# Patient Record
Sex: MALE | Race: WHITE | URBAN - METROPOLITAN AREA
[De-identification: names, ages, dates, MRNs, and addresses within clinical notes are randomized per-mention and may not be internally consistent; named-entity substitution may affect disease eponyms.]

---

## 2017-01-05 ENCOUNTER — OFFICE VISIT (OUTPATIENT)
Dept: FAMILY MEDICINE | Facility: CLINIC | Age: 37
End: 2017-01-05
Payer: COMMERCIAL

## 2017-01-05 VITALS
WEIGHT: 223.5 LBS | BODY MASS INDEX: 31.29 KG/M2 | SYSTOLIC BLOOD PRESSURE: 118 MMHG | HEART RATE: 95 BPM | TEMPERATURE: 97.8 F | RESPIRATION RATE: 14 BRPM | DIASTOLIC BLOOD PRESSURE: 75 MMHG | HEIGHT: 71 IN

## 2017-01-05 DIAGNOSIS — F33.1 MAJOR DEPRESSIVE DISORDER, RECURRENT EPISODE, MODERATE (H): Primary | ICD-10-CM

## 2017-01-05 DIAGNOSIS — I10 ESSENTIAL HYPERTENSION, BENIGN: ICD-10-CM

## 2017-01-05 PROCEDURE — 99213 OFFICE O/P EST LOW 20 MIN: CPT | Performed by: FAMILY MEDICINE

## 2017-01-05 RX ORDER — DULOXETIN HYDROCHLORIDE 60 MG/1
60 CAPSULE, DELAYED RELEASE ORAL DAILY
Qty: 30 CAPSULE | Refills: 3 | Status: SHIPPED | OUTPATIENT
Start: 2017-01-05 | End: 2017-03-10

## 2017-01-05 RX ORDER — LISINOPRIL 20 MG/1
20 TABLET ORAL DAILY
Qty: 30 TABLET | Refills: 3 | Status: SHIPPED | OUTPATIENT
Start: 2017-01-05 | End: 2017-03-10

## 2017-01-05 NOTE — MR AVS SNAPSHOT
"              After Visit Summary   1/5/2017    Jason Ortez    MRN: [id number]           Patient Information     Date Of Birth          1980        Visit Information        Provider Department      1/5/2017 8:00 AM Chris Archer MD [facility]        Today's Diagnoses     Major depressive disorder, recurrent episode, moderate (H)    -  1     Essential hypertension, benign            Follow-ups after your visit        Who to contact     If you have questions or need follow up information about today's clinic visit or your schedule please contact [facility] directly at [phone].  Normal or non-critical lab and imaging results will be communicated to you by MyChart, letter or phone within 4 business days after the clinic has received the results. If you do not hear from us within 7 days, please contact the clinic through [employer] or phone. If you have a critical or abnormal lab result, we will notify you by phone as soon as possible.  Submit refill requests through student[employer] or call your pharmacy and they will forward the refill request to us. Please allow 3 business days for your refill to be completed.          Additional Information About Your Visit        MyChart Information     [employer] gives you secure access to your electronic health record. If you see a primary care provider, you can also send messages to your care team and make appointments. If you have questions, please call your primary care clinic.  If you do not have a primary care provider, please call [phone] and they will assist you.        Care EveryWhere ID     This is your Care EveryWhere ID. This could be used by other organizations to access your [facility] medical records  [id number]        Your Vitals Were     Pulse Temperature Respirations Height BMI (Body Mass Index)       95 97.8  F (36.6  C) (Oral) 14 5' 11\" (1.803 m) 31.19 kg/m2        Blood Pressure from Last 3 Encounters:   01/05/17 " 118/75   12/08/16 172/98   10/19/15 142/88    Weight from Last 3 Encounters:   01/05/17 223 lb 8 oz (101.379 kg)   12/08/16 222 lb 12.8 oz (101.061 kg)   10/19/15 215 lb 9.6 oz (97.796 kg)              Today, you had the following     No orders found for display       Primary Care Provider Office Phone # Fax #    Chris Archer [phone]       [facility] [address] [place] [address]        Thank you!     Thank you for choosing [facility]  for your care. Our goal is always to provide you with excellent care. Hearing back from our patients is one way we can continue to improve our services. Please take a few minutes to complete the written survey that you may receive in the mail after your visit with us. Thank you!             Your Updated Medication List - Protect others around you: Learn how to safely use, store and throw away your medicines at www.disposemymeds.org.          This list is accurate as of: 1/5/17 10:38 AM.  Always use your most recent med list.                   Brand Name Dispense Instructions for use    DULoxetine 60 MG EC capsule    CYMBALTA    30 capsule    Take 1 capsule (60 mg) by mouth daily       lisinopril 20 MG tablet    PRINIVIL/ZESTRIL    30 tablet    Take 1 tablet (20 mg) by mouth daily

## 2017-01-05 NOTE — NURSING NOTE
"Chief Complaint   Patient presents with     Recheck Medication     Cymblata        Initial There were no vitals taken for this visit. Estimated body mass index is 31.09 kg/(m^2) as calculated from the following:    Height as of 12/8/16: 5' 11\" (1.803 m).    Weight as of 12/8/16: 222 lb 12.8 oz (101.061 kg).  BP completed using cuff size: large left arm Inna Marie MA    Gave Patient a PHQ 9 and LANDRY, did not fill it out Inna Marie MA    "

## 2017-01-05 NOTE — PROGRESS NOTES
"  SUBJECTIVE:                                                    Jason Ortez is a 36 year old male who presents to clinic today for the following health issues:      Medication Followup of Cymbalta and Lisinopril    Taking Medication as prescribed: yes    Side Effects:  None    Medication Helping Symptoms:  yes     Feels \"relaxed\", reports less anxiety. Appetite reduced. Seeing counselor once per week at [facility] in [place], [place].    Body mass index is 31.19 kg/(m^2).    Tolerating Lisinopril well. Denies dry cough, swelling.      ROS:  No dry mouth or insomnia.      This document serves as a record of the services and decisions personally performed and made by Chris Archer MD. It was created on his behalf by Iram Rust, a trained medical scribe. The creation of this document is based the provider's statements to the medical scribe.  Iram Rust 8:20 AM January 5, 2017    OBJECTIVE:                                                    /75 mmHg  Pulse 95  Temp(Src) 97.8  F (36.6  C) (Oral)  Resp 14  Ht 5' 11\" (1.803 m)  Wt 223 lb 8 oz (101.379 kg)  BMI 31.19 kg/m2  Body mass index is 31.19 kg/(m^2).     GENERAL: healthy, alert and no distress  PSYCH: mentation appears normal, eye contact is significantly improved, affect remains flat, still using two-word phrases       ASSESSMENT/PLAN:                                                      (F33.1) Major depressive disorder, recurrent episode, moderate (H)  (primary encounter diagnosis)  Comment: His impression is that this medication is far more effective than medications tried in the past, will continue.  Plan: DULoxetine (CYMBALTA) 60 MG EC capsule - Recheck 2 months.    (I10) Essential hypertension, benign  Comment: BP controlled with ACE inhibitor, will continue.  Plan: lisinopril (PRINIVIL/ZESTRIL) 20 MG tablet - Recheck 6 months.    RTC in 2 months    The information in this document, created by a scribe for me, accurately reflects " the services I personally performed and the decisions made by me. I have reviewed and approved this document for accuracy.  9:07 AM January 5, 2017    Chris Archer MD  [facility]

## 2017-01-09 ENCOUNTER — TELEPHONE (OUTPATIENT)
Dept: FAMILY MEDICINE | Facility: CLINIC | Age: 37
End: 2017-01-09

## 2017-01-09 PROBLEM — I10 BENIGN ESSENTIAL HYPERTENSION: Status: ACTIVE | Noted: 2017-01-09

## 2017-01-09 NOTE — Clinical Note
[facility]  [address] [place] [id number]  [phone]  January 17, 2017    Jason JOY [facility] [address]  [facility] [address]    Dear Jason,    I care about your health and have reviewed your health plan. I have reviewed your medical conditions, medication list, and lab results and am making recommendations based on this review, to better manage your health.    You are in particular need of attention regarding:  -Depression    I am recommending that you:  {recommendations: I wanted to touch base with you on your depression.  It is important that your depression is well controlled for you to be healthy.     Please update this PHQ-9, (depression questionnaire) so we can see how you are doing.  Our goal is to have your score be 4 or less.     Here is a list of Health Maintenance topics that are due now or due soon:  Health Maintenance Due   Topic Date Due     TETANUS IMMUNIZATION (SYSTEM ASSIGNED)  01/20/2015     LIPID SCREEN Q5 YR MALE (SYSTEM ASSIGNED)  12/04/2015     PHQ-9 Q6 MONTHS (NO INBASKET)  07/27/2016     INFLUENZA VACCINE (SYSTEM ASSIGNED)  09/01/2016       Please call us at [phone] (or use [employer]) to address the above recommendations.     Thank you for trusting [facility] and we appreciate the opportunity to serve you.  We look forward to supporting your healthcare needs in the future.    Healthy Regards,    Libia Escobar MD

## 2017-01-10 NOTE — TELEPHONE ENCOUNTER
Panel Management Review      Patient has the following on his problem list:     Depression / Dysthymia review  PHQ-9 SCORE 9/2/2015 11/17/2015 1/27/2016   Total Score - - -   Total Score 2 15 1      Patient is due for:  PHQ9    Hypertension   Last three blood pressure readings:  BP Readings from Last 3 Encounters:   01/05/17 118/75   12/08/16 172/98   10/19/15 142/88     Blood pressure: Passed    HTN Guidelines:  Age 18-59 BP range:  Less than 140/90  Age 60-85 with Diabetes:  Less than 140/90  Age 60-85 without Diabetes:  less than 150/90      Composite cancer screening  Chart review shows that this patient is due/due soon for the following None  Summary:    Patient is due/failing the following:   PHQ9    Action needed:   Patient needs to do PHQ9.    Type of outreach:    Phone, left message for patient to call back.     Questions for provider review:    None                                                                                                                                    Ronny Leal CMA       Chart routed to Care Team .

## 2017-01-10 NOTE — TELEPHONE ENCOUNTER
Can you please call this patient and go through PHQ-9 and document on chart?  thanks    Also may be due for tdap

## 2017-03-10 ENCOUNTER — OFFICE VISIT (OUTPATIENT)
Dept: FAMILY MEDICINE | Facility: CLINIC | Age: 37
End: 2017-03-10
Payer: COMMERCIAL

## 2017-03-10 VITALS
WEIGHT: 231.7 LBS | HEIGHT: 71 IN | RESPIRATION RATE: 18 BRPM | HEART RATE: 83 BPM | BODY MASS INDEX: 32.44 KG/M2 | DIASTOLIC BLOOD PRESSURE: 88 MMHG | SYSTOLIC BLOOD PRESSURE: 118 MMHG | OXYGEN SATURATION: 99 %

## 2017-03-10 DIAGNOSIS — F40.298 NEEDLE PHOBIA: ICD-10-CM

## 2017-03-10 DIAGNOSIS — F33.1 MAJOR DEPRESSIVE DISORDER, RECURRENT EPISODE, MODERATE (H): Primary | ICD-10-CM

## 2017-03-10 DIAGNOSIS — R36.9 URETHRAL DISCHARGE IN MALE: ICD-10-CM

## 2017-03-10 DIAGNOSIS — I10 ESSENTIAL HYPERTENSION, BENIGN: ICD-10-CM

## 2017-03-10 PROCEDURE — 99214 OFFICE O/P EST MOD 30 MIN: CPT | Performed by: FAMILY MEDICINE

## 2017-03-10 RX ORDER — DOXYCYCLINE 100 MG/1
100 CAPSULE ORAL 2 TIMES DAILY
Qty: 14 CAPSULE | Refills: 0 | Status: SHIPPED | OUTPATIENT
Start: 2017-03-10 | End: 2017-11-28

## 2017-03-10 RX ORDER — LISINOPRIL 20 MG/1
20 TABLET ORAL DAILY
Qty: 30 TABLET | Refills: 5 | Status: SHIPPED | OUTPATIENT
Start: 2017-03-10 | End: 2017-05-03

## 2017-03-10 RX ORDER — DULOXETIN HYDROCHLORIDE 60 MG/1
60 CAPSULE, DELAYED RELEASE ORAL DAILY
Qty: 30 CAPSULE | Refills: 5 | Status: SHIPPED | OUTPATIENT
Start: 2017-03-10 | End: 2017-05-03

## 2017-03-10 NOTE — PROGRESS NOTES
"  SUBJECTIVE:                                                    Jason Ortez is a 36 year old male who presents to clinic today for the following health issues:    Depression and Anxiety Follow-Up    Status since last visit: No change    Other associated symptoms:None    Complicating factors:     Significant life event: No     Current substance abuse: None    PHQ-9 SCORE 9/2/2015 11/17/2015 1/27/2016   Total Score - - -   Total Score 2 15 1     LANDRY-7 SCORE 9/2/2015 11/17/2015 1/27/2016   Total Score - - -   Total Score 6 18 2        PHQ-9  English      PHQ-9   Any Language     GAD7       Amount of exercise or physical activity: 4-5 days/week for an average of 45-60 minutes    Problems taking medications regularly: No    Medication side effects: none    Diet: regular (no restrictions)    Major depressive disorder, recurrent episode, moderate (H)  (primary encounter diagnosis)  PHQ-9 SCORE 9/2/2015 11/17/2015 1/27/2016   Total Score - - -   Total Score 2 15 1       Needle phobia he refuses lab. Last blood draw \"punched nurse\"  Urethral discharge in male 7-10 days painless refuses evaluation  Essential hypertension, benign controlled  Past Medical History   Diagnosis Date     Cervicalgia 2/12/2014     Controlled substance agreement signed 10/19/2015     Elevated blood pressure 10/21/2015     Generalized anxiety disorder      Cathy Ellen [phone]     Immunization deficiency 10/19/2015     Major depressive disorder, recurrent episode, moderate (H) 10/19/2015     Moderate major depression (H) 5/17/2011     PTSD (post-traumatic stress disorder) 7/23/2014     Screening for other and unspecified cardiovascular conditions 5/17/2011     Social phobia 7/25/2012       Past Surgical History   Procedure Laterality Date     C nonspecific procedure  1998     ORIF talus on the left and removed     Orthopedic surgery       broken talis bone 1998     Vasectomy         Family History   Problem Relation Age of Onset     Family " "History Negative       CANCER Maternal Grandmother      colon cancer     Schizophrenia Maternal Grandmother      MENTAL ILLNESS Maternal Grandmother        Social History   Substance Use Topics     Smoking status: Former Smoker     Packs/day: 3.00     Years: 21.00     Types: Cigarettes     Quit date: 7/12/2011     Smokeless tobacco: Never Used      Comment: PT reports 1-3 ppd habit 5/17/11     Alcohol use Yes      Comment: 3-6 per month         ROS: no fever rash    This document serves as a record of the services and decisions personally performed and made by Gianni Perez MD. It was created on his behalf by Anthony Linda a trained medical scribe. The creation of this document is based the provider's statements to the medical scribe. Anthony Linda March 10, 2017 4:55 PM  OBJECTIVE:                                                    /88 (BP Location: Right arm, Patient Position: Chair, Cuff Size: Adult Regular)  Pulse 83  Resp 18  Ht 1.803 m (5' 11\")  Wt 105.1 kg (231 lb 11.2 oz)  SpO2 99%  BMI 32.32 kg/m2  Body mass index is 32.32 kg/(m^2).  GEN: Healthy, Angry. More engaged than previously     ASSESSMENT/PLAN:                                                    (F40.298) Needle phobia    Comment: Patient declines any blood work Discussed meds: he woul rather forego Rx than have lab    (R36.9) Urethral discharge in male  Comment: Rx empirically. Covers chlamydia non specific urethritis  Plan: doxycycline (VIBRAMYCIN) 100 MG capsule            (F33.1) Major depressive disorder, recurrent episode, moderate (H)  Comment: Refill   PHQ-9 SCORE 9/2/2015 11/17/2015 1/27/2016   Total Score - - -   Total Score 2 15 1   Plan: DULoxetine (CYMBALTA) 60 MG EC capsule        Best response ever. Records from Pediatrics not received    (I10) Essential hypertension, benign  Comment: Refill. controlled  BP Readings from Last 6 Encounters:   03/10/17 118/88   01/05/17 118/75   12/08/16 (!) 172/98   10/19/15 142/88   02/18/15 " 118/84   11/26/14 104/70   Plan: lisinopril (PRINIVIL/ZESTRIL) 20 MG tablet            RCK6 mos. Discussed needle phobia, benefit of lab    The information in this document, created by a scribe for me, accurately reflects the services I personally performed and the decisions made by me. I have reviewed and approved this document for accuracy. 4:55 PM March 10, 2017    JOÃO FOWLER MD  [facility]

## 2017-03-10 NOTE — MR AVS SNAPSHOT
After Visit Summary   3/10/2017    Jason Ortez    MRN: [id number]           Patient Information     Date Of Birth          1980        Visit Information        Provider Department      3/10/2017 4:30 PM Chris Archer MD [facility]        Today's Diagnoses     Major depressive disorder, recurrent episode, moderate (H)    -  1    Needle phobia        Urethral discharge in male        Essential hypertension, benign          Care Instructions    Recommend a blood test        Follow-ups after your visit        Who to contact     If you have questions or need follow up information about today's clinic visit or your schedule please contact [facility] directly at [phone].  Normal or non-critical lab and imaging results will be communicated to you by MyChart, letter or phone within 4 business days after the clinic has received the results. If you do not hear from us within 7 days, please contact the clinic through MyChart or phone. If you have a critical or abnormal lab result, we will notify you by phone as soon as possible.  Submit refill requests through [employer] or call your pharmacy and they will forward the refill request to us. Please allow 3 business days for your refill to be completed.          Additional Information About Your Visit        MyChart Information     [employer] gives you secure access to your electronic health record. If you see a primary care provider, you can also send messages to your care team and make appointments. If you have questions, please call your primary care clinic.  If you do not have a primary care provider, please call [phone] and they will assist you.        Care EveryWhere ID     This is your Care EveryWhere ID. This could be used by other organizations to access your [facility] medical records  [id number]        Your Vitals Were     Pulse Respirations Height Pulse Oximetry BMI (Body Mass Index)       83 18 5'  "11\" (1.803 m) 99% 32.32 kg/m2        Blood Pressure from Last 3 Encounters:   03/10/17 118/88   01/05/17 118/75   12/08/16 (!) 172/98    Weight from Last 3 Encounters:   03/10/17 231 lb 11.2 oz (105.1 kg)   01/05/17 223 lb 8 oz (101.4 kg)   12/08/16 222 lb 12.8 oz (101.1 kg)              Today, you had the following     No orders found for display         Today's Medication Changes          These changes are accurate as of: 3/10/17  8:57 PM.  If you have any questions, ask your nurse or doctor.               Start taking these medicines.        Dose/Directions    doxycycline 100 MG capsule   Commonly known as:  VIBRAMYCIN   Used for:  Urethral discharge in male   Started by:  Chris Archer MD        Dose:  100 mg   Take 1 capsule (100 mg) by mouth 2 times daily   Quantity:  14 capsule   Refills:  0            Where to get your medicines      These medications were sent to [facility] [address]  [address], [facility] [address]     Phone:  [phone]     doxycycline 100 MG capsule    DULoxetine 60 MG EC capsule    lisinopril 20 MG tablet                Primary Care Provider Office Phone # Fax #    Chris Archer [phone]       [address] [address]        Thank you!     Thank you for choosing [facility]  for your care. Our goal is always to provide you with excellent care. Hearing back from our patients is one way we can continue to improve our services. Please take a few minutes to complete the written survey that you may receive in the mail after your visit with us. Thank you!             Your Updated Medication List - Protect others around you: Learn how to safely use, store and throw away your medicines at www.disposemymeds.org.          This list is accurate as of: 3/10/17  8:57 PM.  Always use your most recent med list.                   Brand Name Dispense Instructions for use    " doxycycline 100 MG capsule    VIBRAMYCIN    14 capsule    Take 1 capsule (100 mg) by mouth 2 times daily       DULoxetine 60 MG EC capsule    CYMBALTA    30 capsule    Take 1 capsule (60 mg) by mouth daily       lisinopril 20 MG tablet    PRINIVIL/ZESTRIL    30 tablet    Take 1 tablet (20 mg) by mouth daily

## 2017-03-15 ENCOUNTER — TELEPHONE (OUTPATIENT)
Dept: FAMILY MEDICINE | Facility: CLINIC | Age: 37
End: 2017-03-15

## 2017-03-15 NOTE — TELEPHONE ENCOUNTER
Can you please call this patient and go through PHQ-9 and document on chart?  thanks    Also due for tdap - patient could get a pharmacy too

## 2017-03-15 NOTE — TELEPHONE ENCOUNTER
In January we tried to contact patient to do Panel- and get a phq-9 on him. We called him 2 times and sent him a letter. He has not responded. We will have to wait to try and contact him again, I think. Will discuss with the quality team.   Ronny Leal CMA

## 2017-04-04 ENCOUNTER — OFFICE VISIT (OUTPATIENT)
Dept: FAMILY MEDICINE | Facility: CLINIC | Age: 37
End: 2017-04-04
Payer: COMMERCIAL

## 2017-04-04 VITALS
WEIGHT: 228.9 LBS | HEART RATE: 84 BPM | TEMPERATURE: 98.6 F | RESPIRATION RATE: 18 BRPM | DIASTOLIC BLOOD PRESSURE: 78 MMHG | SYSTOLIC BLOOD PRESSURE: 122 MMHG | HEIGHT: 71 IN | BODY MASS INDEX: 32.05 KG/M2 | OXYGEN SATURATION: 98 %

## 2017-04-04 DIAGNOSIS — J02.9 ACUTE PHARYNGITIS, UNSPECIFIED ETIOLOGY: ICD-10-CM

## 2017-04-04 DIAGNOSIS — R07.0 THROAT PAIN: Primary | ICD-10-CM

## 2017-04-04 LAB
DEPRECATED S PYO AG THROAT QL EIA: NORMAL
MICRO REPORT STATUS: NORMAL
SPECIMEN SOURCE: NORMAL

## 2017-04-04 PROCEDURE — 87880 STREP A ASSAY W/OPTIC: CPT | Performed by: FAMILY MEDICINE

## 2017-04-04 PROCEDURE — 99213 OFFICE O/P EST LOW 20 MIN: CPT | Performed by: FAMILY MEDICINE

## 2017-04-04 PROCEDURE — 87081 CULTURE SCREEN ONLY: CPT | Performed by: FAMILY MEDICINE

## 2017-04-04 RX ORDER — NAPROXEN SODIUM 550 MG/1
550 TABLET ORAL 2 TIMES DAILY WITH MEALS
Qty: 20 TABLET | Refills: 0 | Status: SHIPPED | OUTPATIENT
Start: 2017-04-04 | End: 2018-08-03

## 2017-04-04 RX ORDER — AMOXICILLIN AND CLAVULANATE POTASSIUM 500; 125 MG/1; MG/1
1 TABLET, FILM COATED ORAL 3 TIMES DAILY
Qty: 30 TABLET | Refills: 0 | Status: SHIPPED | OUTPATIENT
Start: 2017-04-04 | End: 2017-11-28

## 2017-04-04 NOTE — MR AVS SNAPSHOT
"              After Visit Summary   4/4/2017    Jason Ortez    MRN: [id number]           Patient Information     Date Of Birth          1980        Visit Information        Provider Department      4/4/2017 3:15 PM Chris Archer MD [facility]        Today's Diagnoses     Throat pain    -  1    Acute pharyngitis, unspecified etiology          Care Instructions    Sugar free menthol drops        Follow-ups after your visit        Who to contact     If you have questions or need follow up information about today's clinic visit or your schedule please contact [facility] directly at [phone].  Normal or non-critical lab and imaging results will be communicated to you by MyChart, letter or phone within 4 business days after the clinic has received the results. If you do not hear from us within 7 days, please contact the clinic through [employer] or phone. If you have a critical or abnormal lab result, we will notify you by phone as soon as possible.  Submit refill requests through [employer] or call your pharmacy and they will forward the refill request to us. Please allow 3 business days for your refill to be completed.          Additional Information About Your Visit        MyChart Information     [employer] gives you secure access to your electronic health record. If you see a primary care provider, you can also send messages to your care team and make appointments. If you have questions, please call your primary care clinic.  If you do not have a primary care provider, please call [phone] and they will assist you.        Care EveryWhere ID     This is your Care EveryWhere ID. This could be used by other organizations to access your [facility] medical records  [id number]        Your Vitals Were     Pulse Temperature Respirations Height Pulse Oximetry BMI (Body Mass Index)    84 98.6  F (37  C) (Oral) 18 5' 11\" (1.803 m) 98% 31.93 kg/m2       Blood Pressure from Last " 3 Encounters:   04/04/17 122/78   03/10/17 118/88   01/05/17 118/75    Weight from Last 3 Encounters:   04/04/17 228 lb 14.4 oz (103.8 kg)   03/10/17 231 lb 11.2 oz (105.1 kg)   01/05/17 223 lb 8 oz (101.4 kg)              We Performed the Following     Beta strep group A culture     Strep, Rapid Screen          Today's Medication Changes          These changes are accurate as of: 4/4/17  8:23 PM.  If you have any questions, ask your nurse or doctor.               Start taking these medicines.        Dose/Directions    amoxicillin-clavulanate 500-125 MG per tablet   Commonly known as:  AUGMENTIN   Used for:  Throat pain, Acute pharyngitis, unspecified etiology   Started by:  Chris Archer MD        Dose:  1 tablet   Take 1 tablet by mouth 3 times daily   Quantity:  30 tablet   Refills:  0       naproxen sodium 550 MG tablet   Commonly known as:  ANAPROX   Used for:  Throat pain, Acute pharyngitis, unspecified etiology   Started by:  Chris Archer MD        Dose:  550 mg   Take 1 tablet (550 mg) by mouth 2 times daily (with meals)   Quantity:  20 tablet   Refills:  0            Where to get your medicines      These medications were sent to [facility] [place] - [address]  [address], [facility] [address]     Phone:  [phone]     amoxicillin-clavulanate 500-125 MG per tablet    naproxen sodium 550 MG tablet                Primary Care Provider Office Phone # Fax #    Chris Archer [phone]       [address] [address]        Thank you!     Thank you for choosing [facility]  for your care. Our goal is always to provide you with excellent care. Hearing back from our patients is one way we can continue to improve our services. Please take a few minutes to complete the written survey that you may receive in the mail after your visit with us. Thank you!             Your Updated Medication List -  Protect others around you: Learn how to safely use, store and throw away your medicines at www.disposemymeds.org.          This list is accurate as of: 4/4/17  8:23 PM.  Always use your most recent med list.                   Brand Name Dispense Instructions for use    amoxicillin-clavulanate 500-125 MG per tablet    AUGMENTIN    30 tablet    Take 1 tablet by mouth 3 times daily       doxycycline 100 MG capsule    VIBRAMYCIN    14 capsule    Take 1 capsule (100 mg) by mouth 2 times daily       DULoxetine 60 MG EC capsule    CYMBALTA    30 capsule    Take 1 capsule (60 mg) by mouth daily       lisinopril 20 MG tablet    PRINIVIL/ZESTRIL    30 tablet    Take 1 tablet (20 mg) by mouth daily       naproxen sodium 550 MG tablet    ANAPROX    20 tablet    Take 1 tablet (550 mg) by mouth 2 times daily (with meals)

## 2017-04-04 NOTE — NURSING NOTE
"Chief Complaint   Patient presents with     Pharyngitis     3 weeks       Initial /78 (BP Location: Right arm, Patient Position: Chair, Cuff Size: Adult Regular)  Pulse 84  Temp 98.6  F (37  C) (Oral)  Resp 18  Ht 5' 11\" (1.803 m)  Wt 228 lb 14.4 oz (103.8 kg)  SpO2 98%  BMI 31.93 kg/m2 Estimated body mass index is 31.93 kg/(m^2) as calculated from the following:    Height as of this encounter: 5' 11\" (1.803 m).    Weight as of this encounter: 228 lb 14.4 oz (103.8 kg).  Medication Reconciliation: aixa Vasquez      "

## 2017-04-04 NOTE — PROGRESS NOTES
"  SUBJECTIVE:                                                    Jason Ortez is a 36 year old male who presents to clinic today for the following health issues:      Acute Illness   Acute illness concerns: sore throat  Onset: 3 weeks    Fever: no     Chills/Sweats: no     Headache (location?): no     Sinus Pressure:YES    Conjunctivitis:  no    Ear Pain: no    Rhinorrhea: no    Congestion: YES    Sore Throat: YES    Heart burn: no     Cough: no    Wheeze: no     Decreased Appetite: YES    Nausea: no     Vomiting: no    Diarrhea:  no    Dysuria/Freq.: no    Fatigue/Achiness: no    Sick/Strep Exposure: no     Therapies Tried and outcome: none      ROS: See above    This document serves as a record of the services and decisions personally performed and made by Gianni Perez MD. It was created on his behalf by Anthony Linda a trained medical scribe. The creation of this document is based the provider's statements to the medical scribe. Anthony Linda April 4, 2017 3:44 PM  OBJECTIVE:                                                    /78 (BP Location: Right arm, Patient Position: Chair, Cuff Size: Adult Regular)  Pulse 84  Temp 98.6  F (37  C) (Oral)  Resp 18  Ht 1.803 m (5' 11\")  Wt 103.8 kg (228 lb 14.4 oz)  SpO2 98%  BMI 31.93 kg/m2  Body mass index is 31.93 kg/(m^2).  GEN: Healthy, Alert, No distress  ENT: ears without cerumen, mucus membranes moist, erythematous tonsils  Erythematous tonsillar pillars    Results for orders placed or performed in visit on 04/04/17   Strep, Rapid Screen   Result Value Ref Range    Specimen Description Throat     Rapid Strep A Screen       NEGATIVE: No Group A streptococcal antigen detected by immunoassay, await   culture report.      Micro Report Status FINAL 04/04/2017         ASSESSMENT/PLAN:                                                    (R07.0) Throat pain  (primary encounter diagnosis)  Comment: Viral syndrome in inaccurate historian with rapport " development needs  Plan: Strep, Rapid Screen, amoxicillin-clavulanate         (AUGMENTIN) 500-125 MG per tablet, naproxen         sodium (ANAPROX) 550 MG tablet, Beta strep         group A culture OTC therapies. Nature of illness, anticipated response discussed            (J02.9) Acute pharyngitis, unspecified etiology  Comment: Broaden database. Rx. Menthol drops  Plan: Strep, Rapid Screen, amoxicillin-clavulanate         (AUGMENTIN) 500-125 MG per tablet, naproxen         sodium (ANAPROX) 550 MG tablet         RCK routinely    The information in this document, created by a scribe for me, accurately reflects the services I personally performed and the decisions made by me. I have reviewed and approved this document for accuracy. 3:44 PM April 4, 2017    JOÃO FOWLER MD  [facility]

## 2017-04-06 ENCOUNTER — TELEPHONE (OUTPATIENT)
Dept: FAMILY MEDICINE | Facility: CLINIC | Age: 37
End: 2017-04-06

## 2017-04-06 LAB
BACTERIA SPEC CULT: NORMAL
MICRO REPORT STATUS: NORMAL
SPECIMEN SOURCE: NORMAL

## 2017-04-06 NOTE — TELEPHONE ENCOUNTER
Called patient, left msg regarding request for a JESSI from 12/08/16. A request for medical records was sent to [facility] ? Never received any records ? Did he hear from them ? CC msg in chart. Ruth Behrens.

## 2017-04-07 NOTE — TELEPHONE ENCOUNTER
Patient returned call. Yes, [facility] and no, he has not heard from them.       The only other medical care was a surgery [facility] in [place] and he is not sure of those records were forwarded to peds clinic.   Juan Henderson RN

## 2017-04-10 NOTE — TELEPHONE ENCOUNTER
Called patient again, had to leave a message. We need patient to call [facility] regarding his request for records since we have had no response from them. We have left them messages and re-faxed request.  Ruth Behrens.

## 2017-04-13 NOTE — TELEPHONE ENCOUNTER
Dr. Archer- we have tried to call both the patient and Alexandrea horton regarding your request. Nobody is getting back to us. Final message was left for patient to call clinic back.   Since this record would be from several years ago, we do not know if they even still have records. Ok to close this encounter?  Thanks,  Ronny Leal CMA

## 2017-05-03 ENCOUNTER — TELEPHONE (OUTPATIENT)
Dept: FAMILY MEDICINE | Facility: CLINIC | Age: 37
End: 2017-05-03

## 2017-05-03 DIAGNOSIS — F33.1 MAJOR DEPRESSIVE DISORDER, RECURRENT EPISODE, MODERATE (H): ICD-10-CM

## 2017-05-03 DIAGNOSIS — I10 ESSENTIAL HYPERTENSION, BENIGN: ICD-10-CM

## 2017-05-03 RX ORDER — DULOXETIN HYDROCHLORIDE 60 MG/1
60 CAPSULE, DELAYED RELEASE ORAL DAILY
Qty: 90 CAPSULE | Refills: 1 | Status: SHIPPED | OUTPATIENT
Start: 2017-05-03 | End: 2017-11-27

## 2017-05-03 RX ORDER — LISINOPRIL 20 MG/1
20 TABLET ORAL DAILY
Qty: 90 TABLET | Refills: 1 | Status: SHIPPED | OUTPATIENT
Start: 2017-05-03 | End: 2018-08-03

## 2017-05-03 NOTE — TELEPHONE ENCOUNTER
Pt calls, wants 90 day supply sent for lisinopril and duloxetine, has refills, resent  Prescription approved per [facility] Refill Protocol.  Sharon [place] RN, BSN

## 2017-08-22 ENCOUNTER — TELEPHONE (OUTPATIENT)
Dept: FAMILY MEDICINE | Facility: CLINIC | Age: 37
End: 2017-08-22

## 2017-08-22 DIAGNOSIS — M79.673 PAIN OF FOOT, UNSPECIFIED LATERALITY: Primary | ICD-10-CM
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
## 2017-09-27 ENCOUNTER — TELEPHONE (OUTPATIENT)
Dept: FAMILY MEDICINE | Facility: CLINIC | Age: 37
End: 2017-09-27

## 2017-09-27 NOTE — LETTER
[address] [id number] [phone]        October 31, 2017  DVS  [address] [facility]   [phone]    Jason Ortez  P [address] [address]  Certificate [id number]              Re Jason Ortez    Mr Ortez requests a disabilty parking certificate on the basis of SEVERE AGORAPHOBIA.    Yours  Chris Acrher MD

## 2017-09-27 NOTE — TELEPHONE ENCOUNTER
Jason Ortez is a 36 year old male who calls with requests disability parking certificate renewed.   Dr. Archer, is a phone visit, e-visit or office visit indicated?   Form in your office.  [phone] OK detailed message  Juan Henderson RN

## 2017-09-28 NOTE — TELEPHONE ENCOUNTER
Called patient, left a message. Disability Parking Certificate, completed by Dr. Archer, but needs patient signature. Will put up front, please make sure patient signs and make a copy for abstracting. Ruth Behrens.

## 2017-10-30 NOTE — TELEPHONE ENCOUNTER
Received a additional form from  [employer], needs more info of disability. Fax to Dr. Archer. Ruth Behrens.

## 2017-10-31 NOTE — TELEPHONE ENCOUNTER
Called and left patient a VM concerning his disability parking certificate renewal form. I advised him that the paperwork along with a letter from Dr Archer will be at the  at the [facility] for patient .    Lola Dubois/

## 2017-10-31 NOTE — TELEPHONE ENCOUNTER
Forms and letter from Dr Archer was also faxed to [place] [employer] fax # [phone].  Copies of paperwork sent to abstract.    Lola Dubois/

## 2017-11-25 DIAGNOSIS — F33.1 MAJOR DEPRESSIVE DISORDER, RECURRENT EPISODE, MODERATE (H): ICD-10-CM

## 2017-11-27 NOTE — TELEPHONE ENCOUNTER
6 month med check was due 9/10/17.  Per below requesting 90 day refill.  Sent to provider.  Please advise.  Adela Perez RN    3/10/17  ASSESSMENT/PLAN:      (F40.298) Needle phobia    Comment: Patient declines any blood work Discussed meds: he woul rather forego Rx than have lab     (R36.9) Urethral discharge in male  Comment: Rx empirically. Covers chlamydia non specific urethritis  Plan: doxycycline (VIBRAMYCIN) 100 MG capsule         (F33.1) Major depressive disorder, recurrent episode, moderate (H)  Comment: Refill   PHQ-9 SCORE 9/2/2015 11/17/2015 1/27/2016   Total Score - - -   Total Score 2 15 1   Plan: DULoxetine (CYMBALTA) 60 MG EC capsule        Best response ever. Records from Pediatrics not received     (I10) Essential hypertension, benign  Comment: Refill. controlled      BP Readings from Last 6 Encounters:   03/10/17 118/88   01/05/17 118/75   12/08/16 (!) 172/98   10/19/15 142/88   02/18/15 118/84   11/26/14 104/70   Plan: lisinopril (PRINIVIL/ZESTRIL) 20 MG tablet             RCK6 mos. Discussed needle phobia, benefit of lab     The information in this document, created by a scribe for me, accurately reflects the services I personally performed and the decisions made by me. I have reviewed and approved this document for accuracy. 4:55 PM March 10, 2017     JOÃO FOWELR MD  [facility]

## 2017-11-28 RX ORDER — DULOXETIN HYDROCHLORIDE 60 MG/1
60 CAPSULE, DELAYED RELEASE ORAL DAILY
Qty: 90 CAPSULE | Refills: 0 | Status: SHIPPED | OUTPATIENT
Start: 2017-11-28 | End: 2018-01-22

## 2017-12-03 ENCOUNTER — NURSE TRIAGE (OUTPATIENT)
Dept: NURSING | Facility: CLINIC | Age: 37
End: 2017-12-03

## 2017-12-04 NOTE — TELEPHONE ENCOUNTER
Reason for Disposition    Taking prescription medication that could cause nausea (e.g., narcotics/opiates, antibiotics, OCPs, many others)    Additional Information    Negative: Shock suspected (e.g., cold/pale/clammy skin, too weak to stand, low BP, rapid pulse)    Negative: Sounds like a life-threatening emergency to the triager    Negative: [1] Nausea or vomiting AND [2] pregnancy < 20 weeks    Negative: Menstrual Period - Missed or Late (i.e., pregnancy suspected)    Negative: Heat exhaustion suspected (i.e., dehydration from heat exposure)    Negative: Motion sickness suspected (i.e., nausea with car, plane, boat, or train travel)    Negative: Anxiety or stress suspected (i.e., nausea with anxiety attacks or stressful situations)    Negative: Traumatic Brain Injury (TBI) suspected    Negative: Vomiting occurs    Negative: Other symptom is present, see that guideline.  (e.g., chest pain, headache, dizziness, abdominal pain, colds, sore throat, etc.).    Negative: Unable to walk, or can only walk with assistance (e.g., requires support)    Negative: Difficulty breathing    Negative: [1] Insulin-dependent diabetes (Type I) AND [2] glucose > 400 mg/dl (22 mmol/l)    Negative: [1] Drinking very little AND [2] dehydration suspected (e.g., no urine > 12 hours, very dry mouth, very lightheaded)    Negative: Patient sounds very sick or weak to the triager    Negative: Fever > 104 F (40 C)    Negative: [1] Fever > 101 F (38.3 C) AND [2] age > 60    Negative: [1] Fever > 101 F (38.3 C) AND [2] bedridden (e.g., nursing home patient, CVA, chronic illness, recovering from surgery)    Negative: [1] Fever > 100.5 F (38.1 C) AND [2] diabetes mellitus or weak immune system (e.g., HIV positive, cancer chemo, splenectomy, organ transplant, chronic steroids)    Negative: Taking any of the following medications: digoxin (Lanoxin), lithium, theophylline, phenytoin (Dilantin)    Negative: Yellowish color of the skin or white of the  "eye (i.e., jaundice)    Negative: Fever present > 3 days (72 hours)    Negative: Receiving cancer chemotherapy medication    Answer Assessment - Initial Assessment Questions  1. NAUSEA SEVERITY: \"How bad is the nausea?\" (e.g., mild, moderate, severe; dehydration, weight loss)    - MILD: loss of appetite without change in eating habits    - MODERATE: decreased oral intake without significant weight loss, dehydration, or malnutrition    - SEVERE: inadequate caloric or fluid intake, significant weight loss, symptoms of dehydration      7 -of 10   2. ONSET: \"When did the nausea begin?\"       early today  3. VOMITING: \"Any vomiting?\" If so, ask: \"How many times today?\"      No   4. RECURRENT SYMPTOM: \"Have you had nausea before?\" If so, ask: \"When was the last time?\" \"What happened that time?\"      no  5. CAUSE: \"What do you think is causing the nausea?\"      Missed dose of medication   6. PREGNANCY: \"Is there any chance you are pregnant?\" (e.g., unprotected intercourse, missed birth control pill, broken condom)      Na    Protocols used: NAUSEA-ADULT-  Jason missed his dose of Cymbalta yesterday, but he has taken it today. After he took it he began to feel nauseated, and dizzy . He is resting now, The dizziness had  Improved, and then cam back when he tried to eat. He denies severe headache, no vision hanges, no  Issues with blood pressure . He is unaware of exposure  oi llness, but has been in public places. If rest  anf hydration do not improve symptoms in next several hours they are  Asked to call again .   "

## 2018-01-22 ENCOUNTER — MYC MEDICAL ADVICE (OUTPATIENT)
Dept: FAMILY MEDICINE | Facility: CLINIC | Age: 38
End: 2018-01-22

## 2018-01-22 ENCOUNTER — TELEPHONE (OUTPATIENT)
Dept: FAMILY MEDICINE | Facility: CLINIC | Age: 38
End: 2018-01-22

## 2018-01-22 DIAGNOSIS — F41.1 GENERALIZED ANXIETY DISORDER: ICD-10-CM

## 2018-01-22 DIAGNOSIS — F33.1 MAJOR DEPRESSIVE DISORDER, RECURRENT EPISODE, MODERATE (H): ICD-10-CM

## 2018-01-22 DIAGNOSIS — F40.10 SOCIAL PHOBIA: Primary | ICD-10-CM

## 2018-01-22 DIAGNOSIS — I10 ESSENTIAL HYPERTENSION, BENIGN: ICD-10-CM

## 2018-01-22 RX ORDER — DULOXETIN HYDROCHLORIDE 20 MG/1
20 CAPSULE, DELAYED RELEASE ORAL 2 TIMES DAILY
Qty: 180 CAPSULE | Refills: 0 | Status: SHIPPED | OUTPATIENT
Start: 2018-01-22 | End: 2018-08-03

## 2018-01-22 NOTE — TELEPHONE ENCOUNTER
"Called patient back.  \"States he has not given any information to Dr. Archer in 6 years and you can either fill or forget it as you are he is not getting in my head\".  Advised I would route that message back.  Adela Perez RN    "

## 2018-01-22 NOTE — TELEPHONE ENCOUNTER
Patient calling and states needs note sent to his doctor to decrease Cymbalta to 40 mg a day.  States does not like the side effects and wants them lessened.  Advised visit was due for 6 month med check back 9/10/17.  Patient just wants message sent.  Please advise.  Adela Perez RN         November 28, 2017            8:37 AM   Chris Archer MD routed this conversation to Cr Triage    Chris Archer MD        8:36 AM   Note      Filled  Will need to change BP med at refill to one that needs lab less  Chris JACK. Gianni            November 27, 2017            5:13 PM   You routed this conversation to Chris Archer MD    Me        5:12 PM   Note      6 month med check was due 9/10/17.  Per below requesting 90 day refill.  Sent to provider.  Please advise.  Adela Perez RN     3/10/17  ASSESSMENT/PLAN:      (F40.298) Needle phobia    Comment: Patient declines any blood work Discussed meds: he woul rather forego Rx than have lab      (R36.9) Urethral discharge in male  Comment: Rx empirically. Covers chlamydia non specific urethritis  Plan: doxycycline (VIBRAMYCIN) 100 MG capsule          (F33.1) Major depressive disorder, recurrent episode, moderate (H)  Comment: Refill   PHQ-9 SCORE 9/2/2015 11/17/2015 1/27/2016   Total Score - - -   Total Score 2 15 1   Plan: DULoxetine (CYMBALTA) 60 MG EC capsule        Best response ever. Records from Pediatrics not received      (I10) Essential hypertension, benign  Comment: Refill. controlled        BP Readings from Last 6 Encounters:   03/10/17 118/88   01/05/17 118/75   12/08/16 (!) 172/98   10/19/15 142/88   02/18/15 118/84   11/26/14 104/70   Plan: lisinopril (PRINIVIL/ZESTRIL) 20 MG tablet         RCK6 mos. Discussed needle phobia, benefit of lab      The information in this document, created by a scribe for me, accurately reflects the services I personally performed and the decisions made by me. I have reviewed and approved this document for accuracy. 4:55 PM March  10, 2017      JOÃO FOWLER MD  [facility]

## 2018-01-22 NOTE — LETTER
"[facility]  [address] [facility] [address]  [phone]        January 23, 2018    Jason Ortez  P [address] [address]              Dear Jason Ortez    We have filled 90 days of cymbalta 20- 2 daily to equal 40 daily    It is clear that ours is not a very helpful medical relationship.    We are going to refer you to our [facility], closer to you    They have a \"behavioral health home\" there, which may suit your needs better.    I hope that that may provide better care than we have been able to do here    They will call you, or you can call them.  [phone]     Sincerely,        Chris Archer MD          "

## 2018-01-23 NOTE — TELEPHONE ENCOUNTER
Letter mailed to patient's home address    Margarette Ivan/[facility]

## 2018-07-06 ENCOUNTER — OFFICE VISIT (OUTPATIENT)
Dept: FAMILY MEDICINE | Facility: CLINIC | Age: 38
End: 2018-07-06
Payer: COMMERCIAL

## 2018-07-06 DIAGNOSIS — L30.9 DERMATITIS: ICD-10-CM

## 2018-07-06 DIAGNOSIS — L20.81 ATOPIC NEURODERMATITIS: Primary | ICD-10-CM

## 2018-07-06 DIAGNOSIS — F33.1 MAJOR DEPRESSIVE DISORDER, RECURRENT EPISODE, MODERATE (H): ICD-10-CM

## 2018-07-06 PROCEDURE — 99213 OFFICE O/P EST LOW 20 MIN: CPT | Performed by: FAMILY MEDICINE

## 2018-07-06 RX ORDER — TRIAMCINOLONE ACETONIDE 5 MG/G
CREAM TOPICAL
Qty: 30 G | Refills: 3 | Status: SHIPPED | OUTPATIENT
Start: 2018-07-06

## 2018-07-06 NOTE — PROGRESS NOTES
SUBJECTIVE:   Jason Ortez is a 37 year old male who presents to clinic today for the following health issues: he has major affective disorder and just doesn't want to talk about his dermatitis today  Rash has redness, scaleymorphology, location legs  and duration yeas   It is  pruritic not painful   Associated symptoms include none revealed   History of past chemical  exposure or contact    Patient is here for a medication refill.  Patient refused to do all vitals.        (L20.81) Atopic neurodermatitis  (primary encounter diagnosis)  Comment:   Plan: improved     (L30.9) Dermatitis  Comment:   Plan: triamcinolone (KENALOG) 0.5 % cream        meds effective     (F33.1) Major depressive disorder, recurrent episode, moderate (H)  Comment: feeling challenged by his skin  Plan: offered consultation and medication he'll consider

## 2018-07-06 NOTE — MR AVS SNAPSHOT
After Visit Summary   7/6/2018    Jason Ortez    MRN: [id number]           Patient Information     Date Of Birth          1980        Visit Information        Provider Department      7/6/2018 4:15 PM Yomi Varghees MD [facility]        Today's Diagnoses     Atopic neurodermatitis    -  1    Dermatitis           Follow-ups after your visit        Who to contact     If you have questions or need follow up information about today's clinic visit or your schedule please contact [facility] directly at [phone].  Normal or non-critical lab and imaging results will be communicated to you by [facility], letter or phone within 4 business days after the clinic has received the results. If you do not hear from us within 7 days, please contact the clinic through [facility] or phone. If you have a critical or abnormal lab result, we will notify you by phone as soon as possible.  Submit refill requests through [employer] or call your pharmacy and they will forward the refill request to us. Please allow 3 business days for your refill to be completed.          Additional Information About Your Visit        MyChart Information     [employer] gives you secure access to your electronic health record. If you see a primary care provider, you can also send messages to your care team and make appointments. If you have questions, please call your primary care clinic.  If you do not have a primary care provider, please call [phone] and they will assist you.        Care EveryWhere ID     This is your Care EveryWhere ID. This could be used by other organizations to access your [place] medical records  [id number]         Blood Pressure from Last 3 Encounters:   04/04/17 122/78   03/10/17 118/88   01/05/17 118/75    Weight from Last 3 Encounters:   04/04/17 228 lb 14.4 oz (103.8 kg)   03/10/17 231 lb 11.2 oz (105.1 kg)   01/05/17 223 lb 8 oz (101.4 kg)              We  Performed the Following     DEPRESSION ACTION PLAN (DAP)          Today's Medication Changes          These changes are accurate as of 7/6/18  4:37 PM.  If you have any questions, ask your nurse or doctor.               Start taking these medicines.        Dose/Directions    triamcinolone 0.5 % cream   Commonly known as:  KENALOG   Used for:  Dermatitis   Started by:  Yomi Varghese MD        Apply topically two times a day   Quantity:  30 g   Refills:  3            Where to get your medicines      These medications were sent to [place] [address] [place] [facility] [address] [place] [address]  [address] [place] [address]     Phone:  [phone]     triamcinolone 0.5 % cream                Primary Care Provider Office Phone # Fax #    Yomi Varghese [phone] [address] [facility] [address]        Equal Access to Services     [facility]: Hadii aad ku hadasho Soomaali, waaxda luqadaha, qaybta kaalmada adeegyada, waxay idiin hayaan adeshamir burgerararedd aly . So [facility] [phone].    ATENCIÓN: Si habla español, tiene a blakely disposición servicios gratuitos de asistencia lingüística. Llame al [phone].    We comply with applicable federal civil rights laws and [place] laws. We do not discriminate on the basis of race, color, national origin, age, disability, sex, sexual orientation, or gender identity.            Thank you!     Thank you for choosing [facility]  for your care. Our goal is always to provide you with excellent care. Hearing back from our patients is one way we can continue to improve our services. Please take a few minutes to complete the written survey that you may receive in the mail after your visit with us. Thank you!             Your Updated Medication List - Protect others around you: Learn how to safely use, store and throw away your medicines at www.disposemymeds.org.          This list is accurate as of 7/6/18  4:37 PM.   Always use your most recent med list.                   Brand Name Dispense Instructions for use Diagnosis    DULoxetine 20 MG EC capsule    CYMBALTA    180 capsule    Take 1 capsule (20 mg) by mouth 2 times daily    Major depressive disorder, recurrent episode, moderate (H), Social phobia, Generalized anxiety disorder       lisinopril 20 MG tablet    PRINIVIL/ZESTRIL    90 tablet    Take 1 tablet (20 mg) by mouth daily    Essential hypertension, benign       nabumetone 750 MG tablet    RELAFEN    30 tablet    Take 1 tablet (750 mg) by mouth daily    Pain of foot, unspecified laterality       naproxen sodium 550 MG tablet    ANAPROX    20 tablet    Take 1 tablet (550 mg) by mouth 2 times daily (with meals)    Throat pain, Acute pharyngitis, unspecified etiology       triamcinolone 0.5 % cream    KENALOG    30 g    Apply topically two times a day    Dermatitis

## 2018-08-03 ENCOUNTER — OFFICE VISIT (OUTPATIENT)
Dept: SLEEP MEDICINE | Facility: CLINIC | Age: 38
End: 2018-08-03
Payer: COMMERCIAL

## 2018-08-03 VITALS — OXYGEN SATURATION: 98 % | BODY MASS INDEX: 36.4 KG/M2 | WEIGHT: 260 LBS | HEIGHT: 71 IN | HEART RATE: 72 BPM

## 2018-08-03 DIAGNOSIS — R06.83 SNORING: Primary | ICD-10-CM

## 2018-08-03 DIAGNOSIS — F99 INSOMNIA DUE TO OTHER MENTAL DISORDER: ICD-10-CM

## 2018-08-03 DIAGNOSIS — F51.05 INSOMNIA DUE TO OTHER MENTAL DISORDER: ICD-10-CM

## 2018-08-03 DIAGNOSIS — R40.0 SLEEPINESS: ICD-10-CM

## 2018-08-03 DIAGNOSIS — I10 ESSENTIAL HYPERTENSION, BENIGN: ICD-10-CM

## 2018-08-03 PROBLEM — E66.01 MORBID OBESITY (H): Status: ACTIVE | Noted: 2018-08-03

## 2018-08-03 PROCEDURE — 99204 OFFICE O/P NEW MOD 45 MIN: CPT | Performed by: PHYSICIAN ASSISTANT
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
## 2018-08-03 NOTE — MR AVS SNAPSHOT
"              After Visit Summary   8/3/2018    Jason Ortez    MRN: [id number]           Patient Information     Date Of Birth          1980        Visit Information        Provider Department      8/3/2018 2:30 PM Goltz, Bennett Ezra, PA-C [facility]        Today's Diagnoses     Snoring    -  1    Essential hypertension, benign        BMI 36.0-36.9,adult        Insomnia due to other mental disorder        Sleepiness          Care Instructions    MY TREATMENT INFORMATION FOR SLEEP APNEA-  Jason Ortez    DOCTOR : Bennett Ezra Goltz, PA-C  SLEEP CENTER : [facility]     MY CONTACT NUMBER: [phone]    Am I having a sleep study at a sleep center?  Make sure you have an appointment for the study before you leave!    Am I having a home sleep study?  Watch this video:  [email or web address].vzaar.com/watch?v=CteI_[email or web address]&[email or web address]  Please verify your insurance coverage with your insurance carrier    Frequently asked questions:  1. What is Obstructive Sleep Apnea (JAE)? JAE is the most common type of sleep apnea. Apnea means, \"without breath.\"  Apnea is most often caused by narrowing or collapse of the upper airway as muscles relax during sleep.   Almost everyone has occasional apneas. Most people with sleep apnea have had brief interruptions at night frequently for many years.  The severity of sleep apnea is related to how frequent and severe the events are.   2. What are the consequences of JAE? Symptoms include: feeling sleepy during the day, snoring loudly, gasping or stopping of breathing, trouble sleeping, and occasionally morning headaches or heartburn at night.  Sleepiness can be serious and even increase the risk of falling asleep while driving. Other health consequences may include development of high blood pressure and other cardiovascular disease in persons who are susceptible. Untreated JAE  can contribute to heart disease, stroke and diabetes.   3. What " are the treatment options? In most situations, sleep apnea is a lifelong disease that must be managed with daily therapy. Medications are not effective for sleep apnea and surgery is generally not considered until other therapies have been tried. Your treatment is your choice . Continuous Positive Airway (CPAP) works right away and is the therapy that is effective in nearly everyone. An oral device to hold your jaw forward is usually the next most reliable option. Other options include postioning devices (to keep you off your back), weight loss, and surgery including a tongue pacing device. There is more detail about some of these options below.    Important tips for using CPAP and similar devices   Know your equipment:  CPAP is continuous positive airway pressure that prevents obstructive sleep apnea by keeping the throat from collapsing while you are sleeping. In most cases, the device is  smart  and can slowly self-adjusts if your throat collapses and keeps a record every day of how well you are treated-this information is available to you and your care team.  BPAP is bilevel positive airway pressure that keeps your throat open and also assists each breath with a pressure boost to maintain adequate breathing.  Special kinds of BPAP are used in patients who have inadequate breathing from lung or heart disease. In most cases, the device is  smart  and can slowly self-adjusts to assist breathing. Like CPAP, the device keeps a record of how well you are treated.  Your mask is your connection to the device. You get to choose what feels most comfortable and the staff will help to make sure if fits. Here: are some examples of the different masks that are available:       Key points to remember on your journey with sleep apnea:  1. Sleep study.  PAP devices often need to be adjusted during a sleep study to show that they are effective and adjusted right.  2. Good tips to remember: Try wearing just the mask during a quiet  time during the day so your body adapts to wearing it. A humidifier is recommended for comfort in most cases to prevent drying of your nose and throat. Allergy medication from your provider may help you if you are having nasal congestion.  3. Getting settled-in. It takes more than one night for most of us to get used to wearing a mask. Try wearing just the mask during a quiet time during the day so your body adapts to wearing it. A humidifier is recommended for comfort in most cases. Our team will work with you carefully on the first day and will be in contact within 4 days and again at 2 and 4 weeks for advice and remote device adjustments. Your therapy is evaluated by the device each day.   4. Use it every night. The more you are able to sleep naturally for 7-8 hours, the more likely you will have good sleep and to prevent health risks or symptoms from sleep apnea. Even if you use it 4 hours it helps. Occasionally all of us are unable to use a medical therapy, in sleep apnea, it is not dangerous to miss one night.   5. Communicate. Call our skilled team on the number provided on the first day if your visit for problems that make it difficult to wear the device. Over 2 out of 3 patients can learn to wear the device long-term with help from our team. Remember to call our team or your sleep providers if you are unable to wear the device as we may have other solutions for those who cannot adapt to mask CPAP therapy. It is recommended that you sleep your sleep provider within the first 3 months and yearly after that if you are not having problems.   Take care of your equipment. Make sure you clean your mask and tubing using directions every day and that your filter and mask are replaced as recommended or if they are not working.     BESIDES CPAP, WHAT OTHER THERAPIES ARE THERE?    Positioning Device  Positioning devices are generally used when sleep apnea is mild and only occurs on your back.This example shows a pillow  that straps around the waist. It may be appropriate for those whose sleep study shows milder sleep apnea that occurs primarily when lying flat on one's back. Preliminary studies have shown benefit but effectiveness at home may need to be verified by a home sleep test. These devices are generally not covered by medical insurance.  Examples of devices that maintain sleeping on the back to prevent snoring and mild sleep apnea.    Belt type body positioner  Http://Gust[employer]/    Electronic reminder  Http://nightshifttherapy.com/  Http://www.[place].[employer].au/    Oral Appliance  What is oral appliance therapy?  An oral appliance device fits on your teeth at night like a retainer used after having braces. The device is made by a specialized dentist and requires several visits over 1-2 months before a manufactured device is made to fit your teeth and is adjusted to prevent your sleep apnea. Once an oral device is working properly, snoring should be improved. A home sleep test may be recommended at that time if to determine whether the sleep apnea is adequately treated.       Some things to remember:  -Oral devices are often, but not always, covered by your medical insurance. Be sure to check with your insurance provider.   -If you are referred for oral therapy, you will be given a list of specialized dentists to consider or you may choose to visit the Web site of the American Academy of Dental Sleep Medicine  -Oral devices are less likely to work if you have severe sleep apnea or are extremely overweight.     More detailed information  An oral appliance is a small acrylic device that fits over the upper and lower teeth  (similar to a retainer or a mouth guard). This device slightly moves jaw forward, which moves the base of the tongue forward, opens the airway, improves breathing for effective treat snoring and obstructive sleep apnea in perhaps 7 out of 10 people .  The best working devices are custom-made by a dental  device  after a mold is made of the teeth 1, 2, 3.  When is an oral appliance indicated?  Oral appliance therapy is recommended as a first-line treatment for patients with primary snoring, mild sleep apnea, and for patients with moderate sleep apnea who prefer appliance therapy to use of CPAP4, 5. Severity of sleep apnea is determined by sleep testing and is based on the number of respiratory events per hour of sleep.   How successful is oral appliance therapy?  The success rate of oral appliance therapy in patients with mild sleep apnea is 75-80% while in patients with moderate sleep apnea it is 50-70%. The chance of success in patients with severe sleep apnea is 40-50%. The research also shows that oral appliances have a beneficial effect on the cardiovascular health of JAE patients at the same magnitude as CPAP therapy7.  Oral appliances should be a second-line treatment in cases of severe sleep apnea, but if not completely successful then a combination therapy utilizing CPAP plus oral appliance therapy may be effective. Oral appliances tend to be effective in a broad range of patients although studies show that the patients who have the highest success are females, younger patients, those with milder disease, and less severe obesity. 3, 6.   Finding a dentist that practices dental sleep medicine  Specific training is available through the American Academy of Dental Sleep Medicine for dentists interested in working in the field of sleep. To find a dentist who is educated in the field of sleep and the use of oral appliances, near you, visit the Web site of the American Academy of Dental Sleep Medicine.    References  1. Lelia et al. Objectively measured vs self-reported compliance during oral appliance therapy for sleep-disordered breathing. Chest 2013; 144(5): 8736-6185.  2. Hermelinda et al. Objective measurement of compliance during oral appliance therapy for sleep-disordered breathing.  Thorax 2013; 68(1): 91-96.  3. Clarice et al. Mandibular advancement devices in 620 men and women with JAE and snoring: tolerability and predictors of treatment success. Chest 2004; 125: 3376-4823.  4. Rakesh et al. Oral appliances for snoring and JAE: a review. Sleep 2006; 29: 244-262.  5. Ajay et al. Oral appliance treatment for JAE: an update. J Clin Sleep Med 2014; 10(2): 215-227.  6. Jordan et al. Predictors of OSAH treatment outcome. J Dent Res 2007; 86: 0703-9963.    Weight Loss:    Weight loss is a long-term strategy that may improve sleep apnea in some patients.    Weight management is a personal decision and the decision should be based on your interest and the potential benefits.  If you are interested in exploring weight loss strategies, the following discussion covers the impact on weight loss on sleep apnea and the approaches that may be successful.    Being overweight does not necessarily mean you will have health consequences.  Those who have BMI over 35 or over 27 with existing medical conditions carries greater risk.   Weight loss decreases severity of sleep apnea in most people with obesity. For those with mild obesity who have developed snoring with weight gain, even 15-30 pound weight loss can improve and occasionally eliminate sleep apnea.  Structured and life-long dietary and health habits are necessary to lose weight and keep healthier weight levels.     Though there may be significant health benefits from weight loss, long-term weight loss is very difficult to achieve- studies show success with dietary management in less than 10% of people. In addition, substantial weight loss may require years of dietary control and may be difficult if patients have severe obesity. In these cases, surgical management may be considered.  Finally, older individuals who have tolerated obesity without health complications may be less likely to benefit from weight loss strategies.      Your BMI is  Body mass index is 36.26 kg/(m^2).  Weight management is a personal decision.  If you are interested in exploring weight loss strategies, the following discussion covers the approaches that may be successful. Body mass index (BMI) is one way to tell whether you are at a healthy weight, overweight, or obese. It measures your weight in relation to your height.  A BMI of 18.5 to 24.9 is in the healthy range. A person with a BMI of 25 to 29.9 is considered overweight, and someone with a BMI of 30 or greater is considered obese. More than two-thirds of American adults are considered overweight or obese.  Being overweight or obese increases the risk for further weight gain. Excess weight may lead to heart disease and diabetes.  Creating and following plans for healthy eating and physical activity may help you improve your health.  Weight control is part of healthy lifestyle and includes exercise, emotional health, and healthy eating habits. Careful eating habits lifelong are the mainstay of weight control. Though there are significant health benefits from weight loss, long-term weight loss with diet alone may be very difficult to achieve- studies show long-term success with dietary management in less than 10% of people. Attaining a healthy weight may be especially difficult to achieve in those with severe obesity. In some cases, medications, devices and surgical management might be considered.  What can you do?  If you are overweight or obese and are interested in methods for weight loss, you should discuss this with your provider.     Consider reducing daily calorie intake by 500 calories.     Keep a food journal.     Avoiding skipping meals, consider cutting portions instead.    Diet combined with exercise helps maintain muscle while optimizing fat loss. Strength training is particularly important for building and maintaining muscle mass. Exercise helps reduce stress, increase energy, and improves fitness. Increasing  exercise without diet control, however, may not burn enough calories to loose weight.       Start walking three days a week 10-20 minutes at a time    Work towards walking thirty minutes five days a week     Eventually, increase the speed of your walking for 1-2 minutes at time    In addition, we recommend that you review healthy lifestyles and methods for weight loss available through the National Institutes of Health patient information sites:  http://win.niddk.nih.gov/publications/index.htm    And look into health and wellness programs that may be available through your health insurance provider, employer, local community center, or [employer].    Weight management plan: Patient was referred to their PCP to discuss a diet and exercise plan.    Surgery:    Surgery for obstructive sleep apnea is considered generally only when other therapies fail to work. Surgery may be discussed with you if you are having a difficult time tolerating CPAP and or when there is an abnormal structure that requires surgical correction.  Nose and throat surgeries often enlarge the airway to prevent collapse.  Most of these surgeries create pain for 1-2 weeks and up to half of the most common surgeries are not effective throughout life.  You should carefully discuss the benefits and drawbacks to surgery with your sleep provider and surgeon to determine if it is the best solution for you.   More information  Surgery for JAE is directed at areas that are responsible for narrowing or complete obstruction of the airway during sleep.  There are a wide range of procedures available to enlarge and/or stabilize the airway to prevent blockage of breathing in the three major areas where it can occur: the palate, tongue, and nasal regions.  Successful surgical treatment depends on the accurate identification of the factors responsible for obstructive sleep apnea in each person.  A personalized approach is required because there is no single  treatment that works well for everyone.  Because of anatomic variation, consultation with an examination by a sleep surgeon is a critical first step in determining what surgical options are best for each patient.  In some cases, examination during sedation may be recommended in order to guide the selection of procedures.  Patients will be counseled about risks and benefits as well as the typical recovery course after surgery. Surgery is typically not a cure for a person s JAE.  However, surgery will often significantly improve one s JAE severity (termed  success rate ).  Even in the absence of a cure, surgery will decrease the cardiovascular risk associated with OSA7; improve overall quality of life8 (sleepiness, functionality, sleep quality, etc).  Palate Procedures:  Patients with JAE often have narrowing of their airway in the region of their tonsils and uvula.  The goals of palate procedures are to widen the airway in this region as well as to help the tissues resist collapse.  Modern palate procedure techniques focus on tissue conservation and soft tissue rearrangement, rather than tissue removal.  Often the uvula is preserved in this procedure. Residual sleep apnea is common in patient after pharyngoplasty with an average reduction in sleep apnea events of 33%2.    Tongue Procedures:  ExamWhile patients are awake, the muscles that surround the throat are active and keep this region open for breathing. These muscles relax during sleep, allowing the tongue and other structures to collapse and block breathing.  There are several different tongue procedures available.  Selection of a tongue base procedure depends on characteristics seen on physical exam.  Generally, procedures are aimed at removing bulky tissues in this area or preventing the back of the tongue from falling back during sleep.  Success rates for tongue surgery range from 50-62%3.  Hypoglossal Nerve Stimulation:  Hypoglossal nerve stimulation has  recently received approval from the [place] Food and Drug Administration for the treatment of obstructive sleep apnea.  This is based on research showing that the system was safe and effective in treating sleep apnea6.  Results showed that the median AHI score decreased 68%, from 29.3 to 9.0. This therapy uses an implant system that senses breathing patterns and delivers mild stimulation to airway muscles, which keeps the airway open during sleep.  The system consists of three fully implanted components: a small generator (similar in size to a pacemaker), a breathing sensor, and a stimulation lead.  Using a small handheld remote, a patient turns the therapy on before bed and off upon awakening.    Candidates for this device must be greater than 22 years of age, have moderate to severe JAE (AHI between 20-65), BMI less than 32, have tried CPAP/oral appliance without success, and have appropriate upper airway anatomy (determined by a sleep endoscopy performed by Dr. Cobb).  Hypoglossal Nerve Stimulation Pathway:    The sleep surgeon s office will work with the patient through the insurance prior-authorization process (including communications and appeals).    Nasal Procedures:  Nasal obstruction can interfere with nasal breathing during the day and night.  Studies have shown that relief of nasal obstruction can improve the ability of some patients to tolerate positive airway pressure therapy for obstructive sleep apnea1.  Treatment options include medications such as nasal saline, topical corticosteroid and antihistamine sprays, and oral medications such as antihistamines or decongestants. Non-surgical treatments can include external nasal dilators for selected patients. If these are not successful by themselves, surgery can improve the nasal airway either alone or in combination with these other options.  Combination Procedures:  Combination of surgical procedures and other treatments may be recommended,  particularly if patients have more than one area of narrowing or persistent positional disease.  The success rate of combination surgery ranges from 66-80%2,3.    References  1. Sisi JACK. The Role of the Nose in Snoring and Obstructive Sleep Apnoea: An Update.  Eur Arch Otorhinolaryngol. 2011; 268: 1365-73.  2.  Jc SM; Rosenda JA; Saturnino JR; Pallanch JF; Rich MB; Denisa SG; Christy FISHER. Surgical modifications of the upper airway for obstructive sleep apnea in adults: a systematic review and meta-analysis. SLEEP 2010;33(10):6096-4649. Yasmeen LOPEZ. Hypopharyngeal surgery in obstructive sleep apnea: an evidence-based medicine review.  Arch Otolaryngol Head Neck Surg. 2006 Feb;132(2):206-13.  3. Hussein CABRALH1, Lopez Y, Elijah CHRISTIAN. The efficacy of anatomically based multilevel surgery for obstructive sleep apnea. Otolaryngol Head Neck Surg. 2003 Oct;129(4):327-35.  4. Yasmeen LOPEZ, Goldberg A. Hypopharyngeal Surgery in Obstructive Sleep Apnea: An Evidence-Based Medicine Review. Arch Otolaryngol Head Neck Surg. 2006 Feb;132(2):206-13.  5. Candy PJ et al. Upper-Airway Stimulation for Obstructive Sleep Apnea.  N Engl J Med. 2014 Jan 9;370(2):139-49.  6. Harjinder Y et al. Increased Incidence of Cardiovascular Disease in Middle-aged Men with Obstructive Sleep Apnea. Am J Respir Crit Care Med; 2002 166: 159-165  7. Lomeli EM et al. Studying Life Effects and Effectiveness of Palatopharyngoplasty (SLEEP) study: Subjective Outcomes of Isolated Uvulopalatopharyngoplasty. Otolaryngol Head Neck Surg. 2011; 144: 623-631.          Follow-ups after your visit        Your next 10 appointments already scheduled     Aug 10, 2018  8:30 PM CDT   PSG Split with BED 1  SLEEP   [facility] ([facility])    [address] [id number] [phone]            Aug 20, 2018  4:45 PM CDT   Return Sleep Patient with Bennett Ezra Goltz, PA-C   [facility]    [address] [id number] [phone]              Future tests that were ordered for you today     Open Future Orders        Priority Expected Expires Ordered    Comprehensive Sleep Study Routine  1/30/2019 8/3/2018            Who to contact     If you have questions or need follow up information about today's clinic visit or your schedule please contact [facility] directly at [phone].  Normal or non-critical lab and imaging results will be communicated to you by [employer], letter or phone within 4 business days after the clinic has received the results. If you do not hear from us within 7 days, please contact the clinic through "[employer]t or phone. If you have a critical or abnormal lab result, we will notify you by phone as soon as possible.  Submit refill requests through [employer] or call your pharmacy and they will forward the refill request to us. Please allow 3 business days for your refill to be completed.          Additional Information About Your Visit        [facility] Information     [facility] gives you secure access to your electronic health record. If you see a primary care provider, you can also send messages to your care team and make appointments. If you have questions, please call your primary care clinic.  If you do not have a primary care provider, please call [phone] and they will assist you.        Care EveryWhere ID     This is your Care EveryWhere ID. This could be used by other organizations to access your [place] medical records  [id number]        Your Vitals Were     Pulse Height Pulse Oximetry BMI (Body Mass Index)          72 1.803 m (5' 11\") 98% 36.26 kg/m2         Blood Pressure from Last 3 Encounters:   04/04/17 122/78   03/10/17 118/88   01/05/17 118/75    Weight from Last 3 Encounters:   08/03/18 117.9 kg (260 lb)   04/04/17 103.8 kg (228 lb 14.4 oz)   03/10/17 105.1 kg (231 lb 11.2 oz)               " Primary Care Provider Office Phone # Fax #    Yomi Varghese [phone] [address] [facility] [address]        Equal Access to Services     PRAKASHDOLORES LORENE : Hadliu renny santos blueo Sorositaali, waaxda luqadaha, qaybta kaalmada adecindy, rose webb laJoselitobety vega. So [facility] [phone].    ATENCIÓN: Si habla español, tiene a blakely disposición servicios gratuitos de asistencia lingüística. Llame al [phone].    We comply with applicable federal civil rights laws and [place] laws. We do not discriminate on the basis of race, color, national origin, age, disability, sex, sexual orientation, or gender identity.            Thank you!     Thank you for choosing [facility]  for your care. Our goal is always to provide you with excellent care. Hearing back from our patients is one way we can continue to improve our services. Please take a few minutes to complete the written survey that you may receive in the mail after your visit with us. Thank you!             Your Updated Medication List - Protect others around you: Learn how to safely use, store and throw away your medicines at www.disposemymeds.org.          This list is accurate as of 8/3/18  3:45 PM.  Always use your most recent med list.                   Brand Name Dispense Instructions for use Diagnosis    DULoxetine 20 MG EC capsule    CYMBALTA    180 capsule    Take 1 capsule (20 mg) by mouth 2 times daily    Major depressive disorder, recurrent episode, moderate (H), Social phobia, Generalized anxiety disorder       lisinopril 20 MG tablet    PRINIVIL/ZESTRIL    90 tablet    Take 1 tablet (20 mg) by mouth daily    Essential hypertension, benign       nabumetone 750 MG tablet    RELAFEN    30 tablet    Take 1 tablet (750 mg) by mouth daily    Pain of foot, unspecified laterality       naproxen sodium 550 MG tablet    ANAPROX    20 tablet    Take 1 tablet (550 mg) by mouth 2 times daily (with  meals)    Throat pain, Acute pharyngitis, unspecified etiology       triamcinolone 0.5 % cream    KENALOG    30 g    Apply topically two times a day    Dermatitis

## 2018-08-03 NOTE — NURSING NOTE
"Chief Complaint   Patient presents with     Sleep Problem     To get better sleep        Initial Ht 1.803 m (5' 11\")  Wt 117.9 kg (260 lb)  BMI 36.26 kg/m2 Estimated body mass index is 36.26 kg/(m^2) as calculated from the following:    Height as of this encounter: 1.803 m (5' 11\").    Weight as of this encounter: 117.9 kg (260 lb).    Medication Reconciliation: complete    Neck circumference: 17 1/4 inches / 44 centimeters.    ESS 8    Sabrina Marcus MA      "

## 2018-08-03 NOTE — PATIENT INSTRUCTIONS
"MY TREATMENT INFORMATION FOR SLEEP APNEA-  Jason Ortez    DOCTOR : Bennett Ezra Goltz, PA-C  SLEEP CENTER : [facility]     MY CONTACT NUMBER: [phone]    Am I having a sleep study at a sleep center?  Make sure you have an appointment for the study before you leave!    Am I having a home sleep study?  Watch this video:  [email or web address]?v=CteI_[email or web address]&[email or web address]  Please verify your insurance coverage with your insurance carrier    Frequently asked questions:  1. What is Obstructive Sleep Apnea (JAE)? JAE is the most common type of sleep apnea. Apnea means, \"without breath.\"  Apnea is most often caused by narrowing or collapse of the upper airway as muscles relax during sleep.   Almost everyone has occasional apneas. Most people with sleep apnea have had brief interruptions at night frequently for many years.  The severity of sleep apnea is related to how frequent and severe the events are.   2. What are the consequences of JAE? Symptoms include: feeling sleepy during the day, snoring loudly, gasping or stopping of breathing, trouble sleeping, and occasionally morning headaches or heartburn at night.  Sleepiness can be serious and even increase the risk of falling asleep while driving. Other health consequences may include development of high blood pressure and other cardiovascular disease in persons who are susceptible. Untreated JAE  can contribute to heart disease, stroke and diabetes.   3. What are the treatment options? In most situations, sleep apnea is a lifelong disease that must be managed with daily therapy. Medications are not effective for sleep apnea and surgery is generally not considered until other therapies have been tried. Your treatment is your choice . Continuous Positive Airway (CPAP) works right away and is the therapy that is effective in nearly everyone. An oral device to hold your jaw forward is usually the next most reliable option. Other " options include postioning devices (to keep you off your back), weight loss, and surgery including a tongue pacing device. There is more detail about some of these options below.    Important tips for using CPAP and similar devices   Know your equipment:  CPAP is continuous positive airway pressure that prevents obstructive sleep apnea by keeping the throat from collapsing while you are sleeping. In most cases, the device is  smart  and can slowly self-adjusts if your throat collapses and keeps a record every day of how well you are treated-this information is available to you and your care team.  BPAP is bilevel positive airway pressure that keeps your throat open and also assists each breath with a pressure boost to maintain adequate breathing.  Special kinds of BPAP are used in patients who have inadequate breathing from lung or heart disease. In most cases, the device is  smart  and can slowly self-adjusts to assist breathing. Like CPAP, the device keeps a record of how well you are treated.  Your mask is your connection to the device. You get to choose what feels most comfortable and the staff will help to make sure if fits. Here: are some examples of the different masks that are available:       Key points to remember on your journey with sleep apnea:  1. Sleep study.  PAP devices often need to be adjusted during a sleep study to show that they are effective and adjusted right.  2. Good tips to remember: Try wearing just the mask during a quiet time during the day so your body adapts to wearing it. A humidifier is recommended for comfort in most cases to prevent drying of your nose and throat. Allergy medication from your provider may help you if you are having nasal congestion.  3. Getting settled-in. It takes more than one night for most of us to get used to wearing a mask. Try wearing just the mask during a quiet time during the day so your body adapts to wearing it. A humidifier is recommended for comfort  in most cases. Our team will work with you carefully on the first day and will be in contact within 4 days and again at 2 and 4 weeks for advice and remote device adjustments. Your therapy is evaluated by the device each day.   4. Use it every night. The more you are able to sleep naturally for 7-8 hours, the more likely you will have good sleep and to prevent health risks or symptoms from sleep apnea. Even if you use it 4 hours it helps. Occasionally all of us are unable to use a medical therapy, in sleep apnea, it is not dangerous to miss one night.   5. Communicate. Call our skilled team on the number provided on the first day if your visit for problems that make it difficult to wear the device. Over 2 out of 3 patients can learn to wear the device long-term with help from our team. Remember to call our team or your sleep providers if you are unable to wear the device as we may have other solutions for those who cannot adapt to mask CPAP therapy. It is recommended that you sleep your sleep provider within the first 3 months and yearly after that if you are not having problems.   Take care of your equipment. Make sure you clean your mask and tubing using directions every day and that your filter and mask are replaced as recommended or if they are not working.     BESIDES CPAP, WHAT OTHER THERAPIES ARE THERE?    Positioning Device  Positioning devices are generally used when sleep apnea is mild and only occurs on your back.This example shows a pillow that straps around the waist. It may be appropriate for those whose sleep study shows milder sleep apnea that occurs primarily when lying flat on one's back. Preliminary studies have shown benefit but effectiveness at home may need to be verified by a home sleep test. These devices are generally not covered by medical insurance.  Examples of devices that maintain sleeping on the back to prevent snoring and mild sleep apnea.    Belt type body positioner   Http://[employer]/    Electronic reminder  Http://nightshifttherapy.com/  Http://[email or web address]au/    Oral Appliance  What is oral appliance therapy?  An oral appliance device fits on your teeth at night like a retainer used after having braces. The device is made by a specialized dentist and requires several visits over 1-2 months before a manufactured device is made to fit your teeth and is adjusted to prevent your sleep apnea. Once an oral device is working properly, snoring should be improved. A home sleep test may be recommended at that time if to determine whether the sleep apnea is adequately treated.       Some things to remember:  -Oral devices are often, but not always, covered by your medical insurance. Be sure to check with your insurance provider.   -If you are referred for oral therapy, you will be given a list of specialized dentists to consider or you may choose to visit the Web site of the American Academy of Dental Sleep Medicine  -Oral devices are less likely to work if you have severe sleep apnea or are extremely overweight.     More detailed information  An oral appliance is a small acrylic device that fits over the upper and lower teeth  (similar to a retainer or a mouth guard). This device slightly moves jaw forward, which moves the base of the tongue forward, opens the airway, improves breathing for effective treat snoring and obstructive sleep apnea in perhaps 7 out of 10 people .  The best working devices are custom-made by a dental device  after a mold is made of the teeth 1, 2, 3.  When is an oral appliance indicated?  Oral appliance therapy is recommended as a first-line treatment for patients with primary snoring, mild sleep apnea, and for patients with moderate sleep apnea who prefer appliance therapy to use of CPAP4, 5. Severity of sleep apnea is determined by sleep testing and is based on the number of respiratory events per hour of sleep.   How successful is oral  appliance therapy?  The success rate of oral appliance therapy in patients with mild sleep apnea is 75-80% while in patients with moderate sleep apnea it is 50-70%. The chance of success in patients with severe sleep apnea is 40-50%. The research also shows that oral appliances have a beneficial effect on the cardiovascular health of JAE patients at the same magnitude as CPAP therapy7.  Oral appliances should be a second-line treatment in cases of severe sleep apnea, but if not completely successful then a combination therapy utilizing CPAP plus oral appliance therapy may be effective. Oral appliances tend to be effective in a broad range of patients although studies show that the patients who have the highest success are females, younger patients, those with milder disease, and less severe obesity. 3, 6.   Finding a dentist that practices dental sleep medicine  Specific training is available through the American Academy of Dental Sleep Medicine for dentists interested in working in the field of sleep. To find a dentist who is educated in the field of sleep and the use of oral appliances, near you, visit the Web site of the American Academy of Dental Sleep Medicine.    References  1. Lelia et al. Objectively measured vs self-reported compliance during oral appliance therapy for sleep-disordered breathing. Chest 2013; 144(5): 2460-5800.  2. Hermelinda et al. Objective measurement of compliance during oral appliance therapy for sleep-disordered breathing. Thorax 2013; 68(1): 91-96.  3. Clarice, et al. Mandibular advancement devices in 620 men and women with JAE and snoring: tolerability and predictors of treatment success. Chest 2004; 125: 1501-7956.  4. Rakesh, et al. Oral appliances for snoring and JAE: a review. Sleep 2006; 29: 244-262.  5. Ajay et al. Oral appliance treatment for JAE: an update. J Clin Sleep Med 2014; 10(2): 215-227.  6. Jordan et al. Predictors of OSAH treatment outcome. J  Dent Res 2007; 86: 1728-3671.    Weight Loss:    Weight loss is a long-term strategy that may improve sleep apnea in some patients.    Weight management is a personal decision and the decision should be based on your interest and the potential benefits.  If you are interested in exploring weight loss strategies, the following discussion covers the impact on weight loss on sleep apnea and the approaches that may be successful.    Being overweight does not necessarily mean you will have health consequences.  Those who have BMI over 35 or over 27 with existing medical conditions carries greater risk.   Weight loss decreases severity of sleep apnea in most people with obesity. For those with mild obesity who have developed snoring with weight gain, even 15-30 pound weight loss can improve and occasionally eliminate sleep apnea.  Structured and life-long dietary and health habits are necessary to lose weight and keep healthier weight levels.     Though there may be significant health benefits from weight loss, long-term weight loss is very difficult to achieve- studies show success with dietary management in less than 10% of people. In addition, substantial weight loss may require years of dietary control and may be difficult if patients have severe obesity. In these cases, surgical management may be considered.  Finally, older individuals who have tolerated obesity without health complications may be less likely to benefit from weight loss strategies.      Your BMI is Body mass index is 36.26 kg/(m^2).  Weight management is a personal decision.  If you are interested in exploring weight loss strategies, the following discussion covers the approaches that may be successful. Body mass index (BMI) is one way to tell whether you are at a healthy weight, overweight, or obese. It measures your weight in relation to your height.  A BMI of 18.5 to 24.9 is in the healthy range. A person with a BMI of 25 to 29.9 is considered  overweight, and someone with a BMI of 30 or greater is considered obese. More than two-thirds of American adults are considered overweight or obese.  Being overweight or obese increases the risk for further weight gain. Excess weight may lead to heart disease and diabetes.  Creating and following plans for healthy eating and physical activity may help you improve your health.  Weight control is part of healthy lifestyle and includes exercise, emotional health, and healthy eating habits. Careful eating habits lifelong are the mainstay of weight control. Though there are significant health benefits from weight loss, long-term weight loss with diet alone may be very difficult to achieve- studies show long-term success with dietary management in less than 10% of people. Attaining a healthy weight may be especially difficult to achieve in those with severe obesity. In some cases, medications, devices and surgical management might be considered.  What can you do?  If you are overweight or obese and are interested in methods for weight loss, you should discuss this with your provider.     Consider reducing daily calorie intake by 500 calories.     Keep a food journal.     Avoiding skipping meals, consider cutting portions instead.    Diet combined with exercise helps maintain muscle while optimizing fat loss. Strength training is particularly important for building and maintaining muscle mass. Exercise helps reduce stress, increase energy, and improves fitness. Increasing exercise without diet control, however, may not burn enough calories to loose weight.       Start walking three days a week 10-20 minutes at a time    Work towards walking thirty minutes five days a week     Eventually, increase the speed of your walking for 1-2 minutes at time    In addition, we recommend that you review healthy lifestyles and methods for weight loss available through the National Institutes of Health patient information sites:   http://win.niddk.nih.gov/publications/index.htm    And look into health and wellness programs that may be available through your health insurance provider, employer, local community center, or [employer].    Weight management plan: Patient was referred to their PCP to discuss a diet and exercise plan.    Surgery:    Surgery for obstructive sleep apnea is considered generally only when other therapies fail to work. Surgery may be discussed with you if you are having a difficult time tolerating CPAP and or when there is an abnormal structure that requires surgical correction.  Nose and throat surgeries often enlarge the airway to prevent collapse.  Most of these surgeries create pain for 1-2 weeks and up to half of the most common surgeries are not effective throughout life.  You should carefully discuss the benefits and drawbacks to surgery with your sleep provider and surgeon to determine if it is the best solution for you.   More information  Surgery for JAE is directed at areas that are responsible for narrowing or complete obstruction of the airway during sleep.  There are a wide range of procedures available to enlarge and/or stabilize the airway to prevent blockage of breathing in the three major areas where it can occur: the palate, tongue, and nasal regions.  Successful surgical treatment depends on the accurate identification of the factors responsible for obstructive sleep apnea in each person.  A personalized approach is required because there is no single treatment that works well for everyone.  Because of anatomic variation, consultation with an examination by a sleep surgeon is a critical first step in determining what surgical options are best for each patient.  In some cases, examination during sedation may be recommended in order to guide the selection of procedures.  Patients will be counseled about risks and benefits as well as the typical recovery course after surgery. Surgery is typically not a  cure for a person s JAE.  However, surgery will often significantly improve one s JAE severity (termed  success rate ).  Even in the absence of a cure, surgery will decrease the cardiovascular risk associated with OSA7; improve overall quality of life8 (sleepiness, functionality, sleep quality, etc).  Palate Procedures:  Patients with JAE often have narrowing of their airway in the region of their tonsils and uvula.  The goals of palate procedures are to widen the airway in this region as well as to help the tissues resist collapse.  Modern palate procedure techniques focus on tissue conservation and soft tissue rearrangement, rather than tissue removal.  Often the uvula is preserved in this procedure. Residual sleep apnea is common in patient after pharyngoplasty with an average reduction in sleep apnea events of 33%2.    Tongue Procedures:  ExamWhile patients are awake, the muscles that surround the throat are active and keep this region open for breathing. These muscles relax during sleep, allowing the tongue and other structures to collapse and block breathing.  There are several different tongue procedures available.  Selection of a tongue base procedure depends on characteristics seen on physical exam.  Generally, procedures are aimed at removing bulky tissues in this area or preventing the back of the tongue from falling back during sleep.  Success rates for tongue surgery range from 50-62%3.  Hypoglossal Nerve Stimulation:  Hypoglossal nerve stimulation has recently received approval from the [place] Food and Drug Administration for the treatment of obstructive sleep apnea.  This is based on research showing that the system was safe and effective in treating sleep apnea6.  Results showed that the median AHI score decreased 68%, from 29.3 to 9.0. This therapy uses an implant system that senses breathing patterns and delivers mild stimulation to airway muscles, which keeps the airway open during sleep.   The system consists of three fully implanted components: a small generator (similar in size to a pacemaker), a breathing sensor, and a stimulation lead.  Using a small handheld remote, a patient turns the therapy on before bed and off upon awakening.    Candidates for this device must be greater than 22 years of age, have moderate to severe JAE (AHI between 20-65), BMI less than 32, have tried CPAP/oral appliance without success, and have appropriate upper airway anatomy (determined by a sleep endoscopy performed by Dr. Cobb).  Hypoglossal Nerve Stimulation Pathway:    The sleep surgeon s office will work with the patient through the insurance prior-authorization process (including communications and appeals).    Nasal Procedures:  Nasal obstruction can interfere with nasal breathing during the day and night.  Studies have shown that relief of nasal obstruction can improve the ability of some patients to tolerate positive airway pressure therapy for obstructive sleep apnea1.  Treatment options include medications such as nasal saline, topical corticosteroid and antihistamine sprays, and oral medications such as antihistamines or decongestants. Non-surgical treatments can include external nasal dilators for selected patients. If these are not successful by themselves, surgery can improve the nasal airway either alone or in combination with these other options.  Combination Procedures:  Combination of surgical procedures and other treatments may be recommended, particularly if patients have more than one area of narrowing or persistent positional disease.  The success rate of combination surgery ranges from 66-80%2,3.    References  1. Sisi JACK. The Role of the Nose in Snoring and Obstructive Sleep Apnoea: An Update.  Eur Arch Otorhinolaryngol. 2011; 268: 1365-73.  2.  Jc SM; Rosenda JA; Saturnino JR; Pallanch JF; Rich DICK; Denisa PÉREZ; Christy FISHER. Surgical modifications of the upper airway for obstructive sleep  apnea in adults: a systematic review and meta-analysis. SLEEP 2010;33(10):1069-5306. Yasmeen LOPEZ. Hypopharyngeal surgery in obstructive sleep apnea: an evidence-based medicine review.  Arch Otolaryngol Head Neck Surg. 2006 Feb;132(2):206-13.  3. Hussein YH1, Lopez Y, Elijah CHRISTIAN. The efficacy of anatomically based multilevel surgery for obstructive sleep apnea. Otolaryngol Head Neck Surg. 2003 Oct;129(4):327-35.  4. Kezirian E, Goldberg A. Hypopharyngeal Surgery in Obstructive Sleep Apnea: An Evidence-Based Medicine Review. Arch Otolaryngol Head Neck Surg. 2006 Feb;132(2):206-13.  5. Candy PORTER et al. Upper-Airway Stimulation for Obstructive Sleep Apnea.  N Engl J Med. 2014 Jan 9;370(2):139-49.  6. Harjinder Y et al. Increased Incidence of Cardiovascular Disease in Middle-aged Men with Obstructive Sleep Apnea. Am J Respir Crit Care Med; 2002 166: 159-165  7. Yani EM et al. Studying Life Effects and Effectiveness of Palatopharyngoplasty (SLEEP) study: Subjective Outcomes of Isolated Uvulopalatopharyngoplasty. Otolaryngol Head Neck Surg. 2011; 144: 623-631.

## 2018-08-10 ENCOUNTER — THERAPY VISIT (OUTPATIENT)
Dept: SLEEP MEDICINE | Facility: CLINIC | Age: 38
End: 2018-08-10
Payer: COMMERCIAL

## 2018-08-10 DIAGNOSIS — R40.0 SLEEPINESS: ICD-10-CM

## 2018-08-10 DIAGNOSIS — I10 ESSENTIAL HYPERTENSION, BENIGN: ICD-10-CM

## 2018-08-10 DIAGNOSIS — R06.83 SNORING: ICD-10-CM

## 2018-08-10 PROCEDURE — 95810 POLYSOM 6/> YRS 4/> PARAM: CPT | Performed by: PSYCHIATRY & NEUROLOGY

## 2018-08-10 NOTE — MR AVS SNAPSHOT
After Visit Summary   8/10/2018    Jason Ortez    MRN: [id number]           Patient Information     Date Of Birth          1980        Visit Information        Provider Department      8/10/2018 8:30 PM BED 1  SLEEP [facility]        Today's Diagnoses     Snoring        Essential hypertension, benign        BMI 36.0-36.9,adult        Sleepiness          Care Instructions    [facility]    1. Your sleep study will be reviewed by a sleep physician within the next few days.     2. Please follow up in the sleep clinic as scheduled, or, make an appointment with your sleep provider to be seen within two weeks to discuss the results of the sleep study.    3. If you have any questions or problems with your treatment plan, please contact your sleep clinic provider at [phone] to further manage your condition.    4. Please review your attached medication list, and, at your follow-up appointment advise your sleep clinic provider about any changes.    5. Go to [email or web address] for more information about your sleep problems.    CEASAR Lobo  August 11, 2018                  Follow-ups after your visit        Your next 10 appointments already scheduled     Aug 20, 2018  4:45 PM CDT   Telephone Visit with Bennett Ezra Goltz, PA-C   [facility] ([facility])    [address] [place] [id number] [phone]           Note: this is not an onsite visit; there is no need to come to the facility.              Who to contact     If you have questions or need follow up information about today's clinic visit or your schedule please contact [facility] directly at [phone].  Normal or non-critical lab and imaging results will be communicated to you by MyChart, letter or phone within 4 business days after the clinic has received the results. If you do not hear  from us within 7 days, please contact the clinic through [facility] or phone. If you have a critical or abnormal lab result, we will notify you by phone as soon as possible.  Submit refill requests through [employer] or call your pharmacy and they will forward the refill request to us. Please allow 3 business days for your refill to be completed.          Additional Information About Your Visit        [facility] Information     [employer] gives you secure access to your electronic health record. If you see a primary care provider, you can also send messages to your care team and make appointments. If you have questions, please call your primary care clinic.  If you do not have a primary care provider, please call [phone] and they will assist you.        Care EveryWhere ID     This is your Care EveryWhere ID. This could be used by other organizations to access your [place] medical records  [id number]         Blood Pressure from Last 3 Encounters:   04/04/17 122/78   03/10/17 118/88   01/05/17 118/75    Weight from Last 3 Encounters:   08/03/18 117.9 kg (260 lb)   04/04/17 103.8 kg (228 lb 14.4 oz)   03/10/17 105.1 kg (231 lb 11.2 oz)              We Performed the Following     Comprehensive Sleep Study        Primary Care Provider Office Phone # Fax #    Yomi Topher Varghese [phone] [address] [facility] [address]        Equal Access to Services     [facility] AH: Hadii aad ku hadasho Soomaali, waaxda luqadaha, qaybta kaalmada adeegyada, rose aly . So [facility] [phone].    ATENCIÓN: Si habla español, tiene a blakely disposición servicios gratuitos de asistencia lingüística. Conor al [phone].    We comply with applicable federal civil rights laws and [place] laws. We do not discriminate on the basis of race, color, national origin, age, disability, sex, sexual orientation, or gender identity.            Thank you!     Thank you for choosing [facility]  for your care. Our goal is always to provide you with excellent care. Hearing back from our patients is one way we can continue to improve our services. Please take a few minutes to complete the written survey that you may receive in the mail after your visit with us. Thank you!             Your Updated Medication List - Protect others around you: Learn how to safely use, store and throw away your medicines at www.disposemymeds.org.          This list is accurate as of 8/10/18 11:59 PM.  Always use your most recent med list.                   Brand Name Dispense Instructions for use Diagnosis    triamcinolone 0.5 % cream    KENALOG    30 g    Apply topically two times a day    Dermatitis

## 2018-08-11 NOTE — PATIENT INSTRUCTIONS
[place] SLE[facility]    1. Your sleep study will be reviewed by a sleep physician within the next few days.     2. Please follow up in the sleep clinic as scheduled, or, make an appointment with your sleep provider to be seen within two weeks to discuss the results of the sleep study.    3. If you have any questions or problems with your treatment plan, please contact your sleep clinic provider at [phone] to further manage your condition.    4. Please review your attached medication list, and, at your follow-up appointment advise your sleep clinic provider about any changes.    5. Go to [email or web address] for more information about your sleep problems.    CEASAR Lobo  August 11, 2018

## 2018-08-15 LAB — SLPCOMP: NORMAL

## 2018-08-15 NOTE — PROCEDURES
" SLEEP STUDY INTERPRETATION  DIAGNOSTIC POLYSOMNOGRAPHY REPORT      Patient: RANI BRYANT  YOB: 1980  Study Date: 8/10/2018  MRN: [id number]  Referring Provider: -  Ordering Provider: Bennett Goltz, PAC    Indications for Polysomnography: The patient is a 37 y old Male who is 5' 11\" and weighs 260.0 lbs. His BMI is 36.4, [place] sleepiness scale 8.0 and neck circumference is 44.0 cm. Relevant medical history includes snoring, witnessed apneas, some dream enactment. A diagnostic polysomnogram was performed to evaluate for sleep apnea/RBD.    Polysomnogram Data: A full night polysomnogram recorded the standard physiologic parameters including EEG, EOG, EMG, ECG, nasal and oral airflow. Respiratory parameters of chest and abdominal movements were recorded with respiratory inductance plethysmography. Oxygen saturation was recorded by pulse oximetry. Hypopnea scoring rule used: 1B 4%.    Sleep Architecture: Sleep fragmentation  The total recording time of the polysomnogram was 511.3 minutes. The total sleep time was 485.5 minutes. Sleep latency was 14.5 minutes. REM latency was 82.5 minutes. Arousal index was 19.9 arousals per hour. Sleep efficiency was 95.0%. Wake after sleep onset was 11.0 minutes. The patient spent 4.3% of total sleep time in Stage N1, 50.6% in Stage N2, 14.9% in Stage N3, and 30.2% in REM. Time in REM supine was 37.5 minutes.    Respiration: This study did not demonstrate evidence suggestive of clinically significant sleep disordered breathing.  This was a good study that included REM supine.    Events ? The polysomnogram revealed a presence of 3 obstructive, 6 central, and - mixed apneas resulting in an apnea index of 1.1 events per hour. There were 20 obstructive hypopneas and - central hypopneas resulting in an obstructive hypopnea index of 2.5 and central hypopnea index of - events per hour. The combined apnea/hypopnea index was 3.6 events per hour (central apnea/hypopnea " index was 0.7 events per hour). The REM AHI was 6.1 events per hour. The supine AHI was 2.4 events per hour. The RERA index was 2.8 events per hour.  The RDI was 6.4 events per hour.    Snoring - was reported as mild-loud.    Respiratory rate and pattern - was notable for normal respiratory rate and pattern.    Sustained Sleep Associated Hypoventilation - Transcutaneous carbon dioxide monitoring was not used.    Sleep Associated Hypoxemia - (Greater than 5 minutes O2 sat at or below 88%) was not present. Baseline oxygen saturation was 95.2%. Lowest oxygen saturation was 90.1%. Time spent less than or equal to 88% was 0 minutes. Time spent less than or equal to 89% was 0 minutes.    Movement Activity: Increased transient motor activity during REM sleep.      Periodic Limb Activity - There were - PLMs during the entire study. The PLM index was - movements per hour. The PLM Arousal Index was - per hour.    REM EMG Activity - Excessive muscle activity was not present.    Nocturnal Behavior - Abnormal sleep related behaviors were not noted.    Bruxism - None apparent.    Cardiac Summary: Sinus  The average pulse rate was 67.8 bpm. The minimum pulse rate was 52.8 bpm while the maximum pulse rate was 97.0 bpm.      Assessment:     This study did not demonstrate evidence suggestive of clinically significant sleep disordered breathing.  This was a good study that included REM supine.     Increased transient motor activity during REM sleep.  Which combined with the history of dream enactment raises the possible diagnosis of RBD.      Recommendations:    Patient may be reassured that, per this study they do not have clinically significant sleep disordered breathing.  If interested in treating snoring options include dental appliance and upper airway surgery.  o Recommend the patient optimize the duration and circadian timing of sleep.     If dream enactment persists consider diagnosing and managing patient for REM sleep  Behavior Disorder.     Advice regarding the risks of drowsy driving.    Suggest optimizing sleep schedule and avoiding sleep deprivation.    Weight management     Diagnostic Codes:    Unspecified Sleep Disturbance G47.9, G47.52        _____________________________________   Electronically Signed By: Axel Lobo MD 8-15-18           Range(%) Time in range (min)   0.0 - 89.0 -   0.0 - 88.0 -         Stage Min(mm Hg) Max(mm Hg)   Wake - -   NREM(1+2+3) - -   REM - -       Range(mmHg) Time in range (min)   55.0 - 100.0 -   Excluded data <20.0 & >65.0 512.0

## 2018-08-20 ENCOUNTER — MYC MEDICAL ADVICE (OUTPATIENT)
Dept: SLEEP MEDICINE | Facility: CLINIC | Age: 38
End: 2018-08-20

## 2018-08-20 ENCOUNTER — TELEPHONE (OUTPATIENT)
Dept: SLEEP MEDICINE | Facility: CLINIC | Age: 38
End: 2018-08-20

## 2018-08-20 NOTE — TELEPHONE ENCOUNTER
Called to confirm the phone consult for patient today on 08/20/18. Patient asked if we could post his results on my chart. I explained we need to schedule a follow-up visit either phone consult or appointment. He said he isn't going to schedule and will figure out a different way. So, he cancelled his phone consult for today and didn't want to reschedule a follow-up with Tigre in office.    Niru Hillman

## 2018-08-21 NOTE — TELEPHONE ENCOUNTER
From: Jason Ortez  To: Goltz, Bennett Ezra, PA-C  Sent: 8/20/2018 4:31 PM CDT  Subject: Updates about my health    Just post my results on my chart. I'm not paying for more office visits. All I want is do I get a sleep apnea machine or not?

## 2018-08-21 NOTE — TELEPHONE ENCOUNTER
This patient refused to pay for a follow up visit. He demands his study results.  I sent him a brief summary of his results and recommend he return for further discussion if desired. He did not have significant apnea.  Bennett Goltz, PA-C

## 2018-08-22 ENCOUNTER — MYC MEDICAL ADVICE (OUTPATIENT)
Dept: SLEEP MEDICINE | Facility: CLINIC | Age: 38
End: 2018-08-22

## 2018-12-28 NOTE — TELEPHONE ENCOUNTER
From: Jason Ortez  To: Goltz, Bennett Ezra, PA-C  Sent: 8/22/2018 5:48 AM CDT  Subject: A follow-up question for a visit within the past seven days    Thank you for your time and response.

## 2019-01-22 ENCOUNTER — MYC MEDICAL ADVICE (OUTPATIENT)
Dept: FAMILY MEDICINE | Facility: CLINIC | Age: 39
End: 2019-01-22

## 2019-01-22 NOTE — TELEPHONE ENCOUNTER
See Mychart name??? Will defer to supervisor, routed    ~will need visit for viagra prescription~ no diagnosis of ED     Last office visit: 7/6/2018 with prescribing provider:     Future Office Visit:    Sharon Franklin RN, BSN  Message handled by Nurse Triage.

## 2019-01-23 NOTE — TELEPHONE ENCOUNTER
[employer] nickname fixed, sent Smish message informing pt needs appointment   Sharon Franklin RN, BSN  Message handled by Nurse Triage.

## 2019-04-30 ENCOUNTER — MYC MEDICAL ADVICE (OUTPATIENT)
Dept: FAMILY MEDICINE | Facility: CLINIC | Age: 39
End: 2019-04-30

## 2019-04-30 NOTE — TELEPHONE ENCOUNTER
Routed to [facility], please advise [facility] medical records request  Sharon Franklin RN, BSN  Message handled by Nurse Triage.

## 2020-02-10 ENCOUNTER — HEALTH MAINTENANCE LETTER (OUTPATIENT)
Age: 40
End: 2020-02-10

## 2020-11-16 ENCOUNTER — HEALTH MAINTENANCE LETTER (OUTPATIENT)
Age: 40
End: 2020-11-16

## 2021-04-03 ENCOUNTER — HEALTH MAINTENANCE LETTER (OUTPATIENT)
Age: 41
End: 2021-04-03

## 2021-09-18 ENCOUNTER — HEALTH MAINTENANCE LETTER (OUTPATIENT)
Age: 41
End: 2021-09-18

## 2022-04-30 ENCOUNTER — HEALTH MAINTENANCE LETTER (OUTPATIENT)
Age: 42
End: 2022-04-30

## 2022-11-19 ENCOUNTER — HEALTH MAINTENANCE LETTER (OUTPATIENT)
Age: 42
End: 2022-11-19

## 2023-06-01 ENCOUNTER — HEALTH MAINTENANCE LETTER (OUTPATIENT)
Age: 43
End: 2023-06-01